# Patient Record
Sex: MALE | Race: WHITE | NOT HISPANIC OR LATINO | Employment: OTHER | URBAN - METROPOLITAN AREA
[De-identification: names, ages, dates, MRNs, and addresses within clinical notes are randomized per-mention and may not be internally consistent; named-entity substitution may affect disease eponyms.]

---

## 2023-06-08 ENCOUNTER — OFFICE VISIT (OUTPATIENT)
Dept: PRIMARY CARE | Facility: CLINIC | Age: 68
End: 2023-06-08
Payer: COMMERCIAL

## 2023-06-08 VITALS — DIASTOLIC BLOOD PRESSURE: 75 MMHG | SYSTOLIC BLOOD PRESSURE: 124 MMHG | WEIGHT: 220 LBS

## 2023-06-08 DIAGNOSIS — M54.10 RADICULOPATHY, UNSPECIFIED SPINAL REGION: Primary | ICD-10-CM

## 2023-06-08 DIAGNOSIS — I10 PRIMARY HYPERTENSION: ICD-10-CM

## 2023-06-08 PROCEDURE — 3078F DIAST BP <80 MM HG: CPT | Performed by: INTERNAL MEDICINE

## 2023-06-08 PROCEDURE — 3074F SYST BP LT 130 MM HG: CPT | Performed by: INTERNAL MEDICINE

## 2023-06-08 PROCEDURE — 99213 OFFICE O/P EST LOW 20 MIN: CPT | Performed by: INTERNAL MEDICINE

## 2023-06-08 NOTE — PROGRESS NOTES
Subjective   Patient ID: Pramod Johnson is a 67 y.o. male who presents for No chief complaint on file..    HPI follow-up visit no chest pain no shortness of breath no side effect with medication bowels normal no dysuria urine stream adequate has been having some cervical neck and left shoulder area discomfort when he sleeps during the day not so bad does not feel it is his shoulder is both shoulders appear to work well and has good overhead elevation no injury    Review of Systems    Objective   There were no vitals taken for this visit.    Physical Exam vital signs noted alert and oriented x3 NCAT cervical spine full range of motion shortened neck tightened muscles posteriorly bilateral shoulders good range of motion normal handgrips DTR 2+ upper extremities no JVD chest clear to auscultation CV regular rate and rhythm S1-S2 without murmur gallop or rub extremities no clubbing cyanosis or edema normal distal pulses    Assessment/Plan impression cervical radiculopathy hypertension other diagnoses  Plan when he returns he may do additional blood work for now he is content we will continue with blood pressure management medication diet exercise weight stability or weight loss he goes to the gymnasium regularly to work out we will obtain x-ray AP lateral cervical spine and advised on possibility probability of medication physical therapy or orthopedic follow-up for the neck and recheck after that Endor sooner 3 months

## 2023-09-07 DIAGNOSIS — Z00.00 HEALTH CARE MAINTENANCE: Primary | ICD-10-CM

## 2023-12-07 ENCOUNTER — OFFICE VISIT (OUTPATIENT)
Dept: PRIMARY CARE | Facility: CLINIC | Age: 68
End: 2023-12-07
Payer: COMMERCIAL

## 2023-12-07 VITALS — SYSTOLIC BLOOD PRESSURE: 131 MMHG | WEIGHT: 218 LBS | DIASTOLIC BLOOD PRESSURE: 71 MMHG

## 2023-12-07 DIAGNOSIS — I10 PRIMARY HYPERTENSION: ICD-10-CM

## 2023-12-07 DIAGNOSIS — N40.0 BENIGN PROSTATIC HYPERPLASIA, UNSPECIFIED WHETHER LOWER URINARY TRACT SYMPTOMS PRESENT: ICD-10-CM

## 2023-12-07 DIAGNOSIS — Z00.00 HEALTH CARE MAINTENANCE: Primary | ICD-10-CM

## 2023-12-07 LAB
ALBUMIN SERPL BCP-MCNC: 4.4 G/DL (ref 3.4–5)
ALP SERPL-CCNC: 83 U/L (ref 33–136)
ALT SERPL W P-5'-P-CCNC: 31 U/L (ref 10–52)
ANION GAP SERPL CALC-SCNC: 13 MMOL/L (ref 10–20)
APPEARANCE UR: CLEAR
AST SERPL W P-5'-P-CCNC: 31 U/L (ref 9–39)
BILIRUB SERPL-MCNC: 0.4 MG/DL (ref 0–1.2)
BILIRUB UR STRIP.AUTO-MCNC: NEGATIVE MG/DL
BUN SERPL-MCNC: 18 MG/DL (ref 6–23)
CALCIUM SERPL-MCNC: 10 MG/DL (ref 8.6–10.6)
CHLORIDE SERPL-SCNC: 106 MMOL/L (ref 98–107)
CHOLEST SERPL-MCNC: 168 MG/DL (ref 0–199)
CHOLESTEROL/HDL RATIO: 3.4
CO2 SERPL-SCNC: 27 MMOL/L (ref 21–32)
COLOR UR: YELLOW
CREAT SERPL-MCNC: 1.22 MG/DL (ref 0.5–1.3)
ERYTHROCYTE [DISTWIDTH] IN BLOOD BY AUTOMATED COUNT: 13.3 % (ref 11.5–14.5)
GFR SERPL CREATININE-BSD FRML MDRD: 66 ML/MIN/1.73M*2
GLUCOSE SERPL-MCNC: 73 MG/DL (ref 74–99)
GLUCOSE UR STRIP.AUTO-MCNC: NEGATIVE MG/DL
HCT VFR BLD AUTO: 47.4 % (ref 41–52)
HDLC SERPL-MCNC: 48.8 MG/DL
HGB BLD-MCNC: 15.6 G/DL (ref 13.5–17.5)
KETONES UR STRIP.AUTO-MCNC: NEGATIVE MG/DL
LDLC SERPL CALC-MCNC: 99 MG/DL
LEUKOCYTE ESTERASE UR QL STRIP.AUTO: NEGATIVE
MCH RBC QN AUTO: 30.1 PG (ref 26–34)
MCHC RBC AUTO-ENTMCNC: 32.9 G/DL (ref 32–36)
MCV RBC AUTO: 91 FL (ref 80–100)
NITRITE UR QL STRIP.AUTO: NEGATIVE
NON HDL CHOLESTEROL: 119 MG/DL (ref 0–149)
NRBC BLD-RTO: 0 /100 WBCS (ref 0–0)
PH UR STRIP.AUTO: 5 [PH]
PLATELET # BLD AUTO: 279 X10*3/UL (ref 150–450)
POTASSIUM SERPL-SCNC: 4.6 MMOL/L (ref 3.5–5.3)
PROT SERPL-MCNC: 7 G/DL (ref 6.4–8.2)
PROT UR STRIP.AUTO-MCNC: NEGATIVE MG/DL
PSA SERPL-MCNC: 3.75 NG/ML
RBC # BLD AUTO: 5.19 X10*6/UL (ref 4.5–5.9)
RBC # UR STRIP.AUTO: NEGATIVE /UL
SODIUM SERPL-SCNC: 141 MMOL/L (ref 136–145)
SP GR UR STRIP.AUTO: 1.02
TRIGL SERPL-MCNC: 99 MG/DL (ref 0–149)
UROBILINOGEN UR STRIP.AUTO-MCNC: <2 MG/DL
VLDL: 20 MG/DL (ref 0–40)
WBC # BLD AUTO: 7.2 X10*3/UL (ref 4.4–11.3)

## 2023-12-07 PROCEDURE — 99397 PER PM REEVAL EST PAT 65+ YR: CPT | Performed by: INTERNAL MEDICINE

## 2023-12-07 PROCEDURE — 80053 COMPREHEN METABOLIC PANEL: CPT

## 2023-12-07 PROCEDURE — 81003 URINALYSIS AUTO W/O SCOPE: CPT

## 2023-12-07 PROCEDURE — 84153 ASSAY OF PSA TOTAL: CPT

## 2023-12-07 PROCEDURE — 3078F DIAST BP <80 MM HG: CPT | Performed by: INTERNAL MEDICINE

## 2023-12-07 PROCEDURE — 80061 LIPID PANEL: CPT

## 2023-12-07 PROCEDURE — 85027 COMPLETE CBC AUTOMATED: CPT

## 2023-12-07 PROCEDURE — 93000 ELECTROCARDIOGRAM COMPLETE: CPT | Performed by: INTERNAL MEDICINE

## 2023-12-07 PROCEDURE — 36415 COLL VENOUS BLD VENIPUNCTURE: CPT

## 2023-12-07 PROCEDURE — 3075F SYST BP GE 130 - 139MM HG: CPT | Performed by: INTERNAL MEDICINE

## 2023-12-07 NOTE — PROGRESS NOTES
Subjective   Patient ID: Pramod Johnson is a 67 y.o. male who presents for No chief complaint on file..    HPI CPE see updated front sheet no chest pain no shortness of breath no side effect with medication his low back has been in good standing urine flow sometimes hesitant and some nocturia rarely no dysuria bones muscles joints otherwise okay    Past medical history hypertension    Medication amlodipine    Allergies noted and unchanged    Social history no tobacco    Family history noted and unchanged    Prevention discussed with colonoscopy discussed with vaccinations he is at the [employer] vaccination    Depression screen not depressed    Review of Systems    Objective   There were no vitals taken for this visit.    Physical Exam vital signs noted alert and oriented x 3 NCAT PERRLA EOMI nares without discharge OP benign TM normal bilateral EAC clear bilateral no AC nodes no JVD or bruit no thyromegaly chest clear to auscultation and percussion CV regular rate and rhythm S1-S2 without murmur gallop or rub abdomen soft nontender normal active bowel sounds no rebound or guarding no HSM LS spine normal curvature negative straight leg raise negative logroll negative SI joint tenderness extremities no clubbing cyanosis or edema normal distal pulses DTR 2+    Assessment/Plan    impression General medical examination hypertension other diagnoses BPH  Plan check EKG advised on heart check Chem-7 advised on glucose potassium and kidney function check CBC advised on blood count check lipid panel advised on cholesterol profile check hepatic panel advised on liver enzymes check PSA advised on prostate check urinalysis by request advised on possibility probability of infection irritation and inflammation good overall diet regular exercise increase water consumption recheck 3 months based on labs TT 60 cc 31

## 2024-01-05 DIAGNOSIS — I10 BENIGN ESSENTIAL HYPERTENSION: Primary | ICD-10-CM

## 2024-01-05 RX ORDER — AMLODIPINE BESYLATE 5 MG/1
5 TABLET ORAL DAILY
Qty: 90 TABLET | Refills: 0 | Status: SHIPPED | OUTPATIENT
Start: 2024-01-05 | End: 2024-01-16 | Stop reason: SDUPTHER

## 2024-01-05 RX ORDER — AMLODIPINE BESYLATE 5 MG/1
5 TABLET ORAL DAILY
COMMUNITY
Start: 2016-04-21 | End: 2024-01-05 | Stop reason: SDUPTHER

## 2024-01-10 ENCOUNTER — TELEPHONE (OUTPATIENT)
Dept: PRIMARY CARE | Facility: CLINIC | Age: 69
End: 2024-01-10

## 2024-01-10 DIAGNOSIS — I10 BENIGN ESSENTIAL HYPERTENSION: ICD-10-CM

## 2024-01-10 RX ORDER — AMLODIPINE BESYLATE 5 MG/1
5 TABLET ORAL DAILY
Qty: 90 TABLET | Refills: 0 | OUTPATIENT
Start: 2024-01-10

## 2024-01-16 ENCOUNTER — TELEPHONE (OUTPATIENT)
Dept: PRIMARY CARE | Facility: CLINIC | Age: 69
End: 2024-01-16

## 2024-01-16 DIAGNOSIS — I10 BENIGN ESSENTIAL HYPERTENSION: ICD-10-CM

## 2024-01-16 RX ORDER — AMLODIPINE BESYLATE 5 MG/1
5 TABLET ORAL DAILY
Qty: 90 TABLET | Refills: 0 | Status: SHIPPED | OUTPATIENT
Start: 2024-01-16 | End: 2024-04-02 | Stop reason: SDUPTHER

## 2024-03-15 ENCOUNTER — HOSPITAL ENCOUNTER (OUTPATIENT)
Dept: RADIOLOGY | Facility: CLINIC | Age: 69
Discharge: HOME | End: 2024-03-15
Payer: COMMERCIAL

## 2024-03-15 ENCOUNTER — OFFICE VISIT (OUTPATIENT)
Dept: ORTHOPEDIC SURGERY | Facility: CLINIC | Age: 69
End: 2024-03-15
Payer: COMMERCIAL

## 2024-03-15 DIAGNOSIS — M79.641 RIGHT HAND PAIN: Primary | ICD-10-CM

## 2024-03-15 DIAGNOSIS — M79.641 RIGHT HAND PAIN: ICD-10-CM

## 2024-03-15 PROCEDURE — 1125F AMNT PAIN NOTED PAIN PRSNT: CPT | Performed by: STUDENT IN AN ORGANIZED HEALTH CARE EDUCATION/TRAINING PROGRAM

## 2024-03-15 PROCEDURE — 73130 X-RAY EXAM OF HAND: CPT | Mod: RIGHT SIDE | Performed by: RADIOLOGY

## 2024-03-15 PROCEDURE — 73130 X-RAY EXAM OF HAND: CPT | Mod: RT

## 2024-03-15 PROCEDURE — 1159F MED LIST DOCD IN RCRD: CPT | Performed by: STUDENT IN AN ORGANIZED HEALTH CARE EDUCATION/TRAINING PROGRAM

## 2024-03-15 PROCEDURE — 99204 OFFICE O/P NEW MOD 45 MIN: CPT | Performed by: STUDENT IN AN ORGANIZED HEALTH CARE EDUCATION/TRAINING PROGRAM

## 2024-03-15 ASSESSMENT — PAIN - FUNCTIONAL ASSESSMENT: PAIN_FUNCTIONAL_ASSESSMENT: 0-10

## 2024-03-15 ASSESSMENT — PAIN DESCRIPTION - DESCRIPTORS: DESCRIPTORS: ACHING

## 2024-03-15 ASSESSMENT — PAIN SCALES - GENERAL: PAINLEVEL_OUTOF10: 3

## 2024-03-15 NOTE — PROGRESS NOTES
[facility]   Hand and Upper Extremity Service  Initial evaluation / Consultation        Consult requested by Referring Physician: Surya Mccain DO     Chief Complaint: Right ring finger pain     Patient is a pleasant 68-year-old male who presents with right hand pain.  His pain is primarily located to the PIP joint of his right ring finger.  This is a longstanding however it is recently asked exacerbated over the last few months.  He denies any clicking or locking.  The pain is located to the dorsal PIP joint.  He notes he has had previous corticosteroid injections in that region several years ago by Dr. Marie.  He notes good relief with his previous injections.  He denies any numbness or tingling.        Please refer to New Patient Intake Form scanned into patient's electronic record for self reported past medical history, past surgical history, medications, allergies, family history, social history and 10 point review of systems     Examination:  Constitutional: Oriented to person, place, and time.  Appears well-developed and well-nourished.  Head: Normocephalic and atraumatic.  Eyes: Pupils are equal, round, and reactive to light.  Cardiovascular: Intact distal pulses.   Pulmonary/Chest/Breast: Effort normal. No respiratory distress.  Neurological: Alert and oriented to person, place, and time.  Skin: Skin is warm and dry.  Psychiatric: normal mood and affect.  Behavior is normal.  Musculoskeletal: Right Hand Ring Finger  No swelling or ecchymosis present.  Mild tenderness to palpation over the PIP joint dorsally.  Nontender over the collateral ligaments or volar plate.  No instability to stress testing.  Patient can fully extend the MP and IP joints.  Can make a composite fist to the distal palmar crease.  He is nontender over the A1 pulley.  Extensor tendons are centered over the MCP joint.  There is no clicking or locking noted.  Sensations intact to light touch to radial  ulnar sides of the finger.  AIN, PIN, ulnar motor nerves intact.  Sensation intact to light touch to radial, ulnar, median nerves.  Brisk capillary refill         Personal Interpretation of Diagnostic studies: XR of the right hand was taken reviewed in office today  3 views demonstrate no fracture dislocations.  No significant arthritic or erosive changes.  Impression normal right hand x-rays         Impression: Right ring finger PIP joint pain.         In Office Procedures Performed: None         Medical Decision Making:   Patient pain is very mild and likely related to mild arthritic changes at the PIP joint.  We discussed conservative management form of anti-inflammatories and diclofenac gel.  He was also given stretches to do to maintain motion.  The patient will continue to use his hand and work on stretches.  We discussed that this pain persist and worsens then We would consider a corticosteroid injection to the PIP joint.  He states the pain is not at that point at this time.  Overall he is happy with this plan.         Medications Prescribed: None        Follow up: As needed           Will BeucleDO obdulio  [facility]  Department of Orthopaedic Surgery  Hand and Upper Extremity Reconstruction           Dictation performed with the use of voice recognition software.  Syntax and grammatical errors may exist.

## 2024-04-02 DIAGNOSIS — I10 BENIGN ESSENTIAL HYPERTENSION: ICD-10-CM

## 2024-04-02 RX ORDER — AMLODIPINE BESYLATE 5 MG/1
5 TABLET ORAL DAILY
Qty: 90 TABLET | Refills: 0 | Status: SHIPPED | OUTPATIENT
Start: 2024-04-02

## 2024-06-06 ENCOUNTER — OFFICE VISIT (OUTPATIENT)
Dept: PRIMARY CARE | Facility: CLINIC | Age: 69
End: 2024-06-06
Payer: COMMERCIAL

## 2024-06-06 VITALS — SYSTOLIC BLOOD PRESSURE: 118 MMHG | WEIGHT: 220 LBS | DIASTOLIC BLOOD PRESSURE: 75 MMHG

## 2024-06-06 DIAGNOSIS — G44.209 TENSION HEADACHE: ICD-10-CM

## 2024-06-06 DIAGNOSIS — R42 DIZZINESS: ICD-10-CM

## 2024-06-06 DIAGNOSIS — I10 PRIMARY HYPERTENSION: Primary | ICD-10-CM

## 2024-06-06 PROCEDURE — 3074F SYST BP LT 130 MM HG: CPT | Performed by: INTERNAL MEDICINE

## 2024-06-06 PROCEDURE — 3078F DIAST BP <80 MM HG: CPT | Performed by: INTERNAL MEDICINE

## 2024-06-06 PROCEDURE — 99213 OFFICE O/P EST LOW 20 MIN: CPT | Performed by: INTERNAL MEDICINE

## 2024-06-06 NOTE — PROGRESS NOTES
Subjective   Patient ID: Pramod Johnson is a 68 y.o. male who presents for No chief complaint on file..    HPI follow-up visit no chest pain no shortness of breath no side effect with medication running sometimes up to 10 hours and training for a 50 mile race occasionally will have brief dizziness lasting seconds does not have much in the way of sinus sometimes has some headaches but always better after eating and getting the hydrated no bowel or bladder issues prior blood work reviewed    Review of Systems    Objective   There were no vitals taken for this visit.    Physical Exam  Vital signs noted alert and oriented x 3 NCAT no coryza nares without discharge OP benign TM normal bilateral EAC clear bilateral no AC nodes no JVD chest clear to auscultation CV regular rate and rhythm S1-S2 without murmur gallop rub or skip extremities no clubbing cyanosis or edema normal distal pulses   Assessment/Plan    impression hypertension other diagnoses headache dizziness  Plan stay well-hydrated especially while running he actually carries his food and drink while running in a backpack sort of way not typical of marathon running continue with blood pressure medicine he takes it at night so does not interfere with racing Tylenol as needed Zyrtec as needed then recheck regular visit physical examination blood work discussed with his recent PSA values not having any symptoms follow-up sooner as needed

## 2024-06-24 DIAGNOSIS — I10 BENIGN ESSENTIAL HYPERTENSION: Primary | ICD-10-CM

## 2024-06-24 RX ORDER — AMLODIPINE BESYLATE 5 MG/1
5 TABLET ORAL DAILY
Qty: 90 TABLET | Refills: 0 | Status: SHIPPED | OUTPATIENT
Start: 2024-06-24

## 2024-08-23 ENCOUNTER — APPOINTMENT (OUTPATIENT)
Dept: ORTHOPEDIC SURGERY | Facility: CLINIC | Age: 69
End: 2024-08-23
Payer: COMMERCIAL

## 2024-08-23 DIAGNOSIS — M65.342 ACQUIRED TRIGGER FINGER OF BOTH RING FINGERS: ICD-10-CM

## 2024-08-23 DIAGNOSIS — M65.341 ACQUIRED TRIGGER FINGER OF BOTH RING FINGERS: ICD-10-CM

## 2024-08-23 DIAGNOSIS — M65.332 ACQUIRED TRIGGER FINGER OF BOTH MIDDLE FINGERS: Primary | ICD-10-CM

## 2024-08-23 DIAGNOSIS — M65.331 ACQUIRED TRIGGER FINGER OF BOTH MIDDLE FINGERS: Primary | ICD-10-CM

## 2024-08-23 RX ORDER — TRIAMCINOLONE ACETONIDE 40 MG/ML
20 INJECTION, SUSPENSION INTRA-ARTICULAR; INTRAMUSCULAR
Status: COMPLETED | OUTPATIENT
Start: 2024-08-23 | End: 2024-08-23

## 2024-08-23 ASSESSMENT — PAIN - FUNCTIONAL ASSESSMENT: PAIN_FUNCTIONAL_ASSESSMENT: 0-10

## 2024-08-23 ASSESSMENT — PAIN DESCRIPTION - DESCRIPTORS: DESCRIPTORS: ACHING

## 2024-08-23 ASSESSMENT — PAIN SCALES - GENERAL: PAINLEVEL_OUTOF10: 7

## 2024-08-23 NOTE — PROGRESS NOTES
History of Present Illness:  Patient presents today for evaluation of side: bilaterallong and ring  finger pain. Endorses catching of the digit with flexion. Denies any traumatic event or injury. The patient notes that it is worse with periods of disuse and in the morning and better when warmed up.  The patient endorses localized, focal pain when it catches / sharp in nature.  The patient denies any numbness and tingling.      He has had previous trigger finger injections by Dr. Marie in the past with good relief.    Past Medical History:   Diagnosis Date    Benign prostatic hyperplasia without lower urinary tract symptoms 12/07/2020    Prostatism       Medication Documentation Review Audit       Reviewed by Fransisca Rene MA (Medical Assistant) on 08/23/24 at 0818      Medication Order Taking? Sig Documenting Provider Last Dose Status   amLODIPine (Norvasc) 5 mg tablet [id number]  Take 1 tablet (5 mg) by mouth once daily. Nj Kwon MD  Active                    No Known Allergies    Social History     Socioeconomic History    Marital status:      Spouse name: Not on file    Number of children: Not on file    Years of education: Not on file    Highest education level: Not on file   Occupational History    Not on file   Tobacco Use    Smoking status: Not on file    Smokeless tobacco: Not on file   Substance and Sexual Activity    Alcohol use: Not on file    Drug use: Not on file    Sexual activity: Not on file   Other Topics Concern    Not on file   Social History Narrative    Not on file     Social Determinants of Health     Financial Resource Strain: Not on file   Food Insecurity: Not on file   Transportation Needs: Not on file   Physical Activity: Not on file   Stress: Not on file   Social Connections: Not on file   Intimate Partner Violence: Not on file   Housing Stability: Not on file       No past surgical history on file.       Review of Systems   GENERAL: Negative for malaise,  significant weight loss, fever  MUSCULOSKELETAL: see HPI  NEURO:  Negative     Physical Examination  side: bilateral hand  ring and long  finger:  No obvious atrophy, no skin changes  Tenderness, palpable nodule along the flexor tendon sheath with passive ROM  Positive triggering with active flexion and extension   No subluxation of the extensor tendon appreciated over the MP joint.  Normal neurovascular exam distally     Imaging: None     Assessment:  Patient with side: bilateralhand  ring and long  digit trigger finger     Plan:  We reviewed the disease process with the patient. I explained the etiology of trigger finger. As the flexor tendons enter the flexor tendon sheath at the level of the A1 pulley, there is a size mismatch. Due to many years of use, the tendon has become inflamed or the sheath has become inflamed and it no longer easily glides into the tunnel.   We discussed the role for injections. The majority of patients (60-80%) will achieve symptom relief with corticosteroid injection, although sometimes it takes more than one. Patients with diabetes or symptoms for longer than 6 months have a lower chance of successful resolution of symptoms with injections. Patients who are not satisfied with symptom relief from injections may be candidates for trigger finger release.  We discussed surgical intervention reviewing the risks (bleeding, neurologic injury, wound issues including infection, and recurrence) and benefits.  The patient elected for: Corticosteroid injections to bilateral ring and long trigger fingers.  Patient tolerated procedure well.         Hand / UE Inj/Asp: bilateral long A1 for trigger finger on 8/23/2024 12:43 PM  Indications: therapeutic, pain and tendon swelling  Details: 25 G needle, volar approach  Medications (Right): 20 mg triamcinolone acetonide 40 mg/mL  Medications (Left): 20 mg triamcinolone acetonide 40 mg/mL  Outcome: tolerated well, no immediate complications  Procedure,  treatment alternatives, risks and benefits explained, specific risks discussed. Consent was given by the patient. Immediately prior to procedure a time out was called to verify the correct patient, procedure, equipment, support staff and site/side marked as required. Patient was prepped and draped in the usual sterile fashion.       Hand / UE Inj/Asp: bilateral ring A1 for trigger finger on 8/23/2024 12:43 PM  Indications: therapeutic, pain and tendon swelling  Details: 25 G needle, volar approach  Medications (Right): 20 mg triamcinolone acetonide 40 mg/mL  Medications (Left): 20 mg triamcinolone acetonide 40 mg/mL  Outcome: tolerated well, no immediate complications  Procedure, treatment alternatives, risks and benefits explained, specific risks discussed. Consent was given by the patient. Immediately prior to procedure a time out was called to verify the correct patient, procedure, equipment, support staff and site/side marked as required. Patient was prepped and draped in the usual sterile fashion.

## 2024-09-19 DIAGNOSIS — I10 BENIGN ESSENTIAL HYPERTENSION: ICD-10-CM

## 2024-09-19 RX ORDER — AMLODIPINE BESYLATE 5 MG/1
5 TABLET ORAL DAILY
Qty: 90 TABLET | Refills: 0 | Status: SHIPPED | OUTPATIENT
Start: 2024-09-19

## 2024-12-05 ENCOUNTER — APPOINTMENT (OUTPATIENT)
Dept: PRIMARY CARE | Facility: CLINIC | Age: 69
End: 2024-12-05
Payer: COMMERCIAL

## 2024-12-05 ENCOUNTER — LAB (OUTPATIENT)
Dept: LAB | Facility: LAB | Age: 69
End: 2024-12-05
Payer: COMMERCIAL

## 2024-12-05 VITALS — DIASTOLIC BLOOD PRESSURE: 76 MMHG | WEIGHT: 220 LBS | SYSTOLIC BLOOD PRESSURE: 135 MMHG

## 2024-12-05 DIAGNOSIS — N40.0 BENIGN PROSTATIC HYPERPLASIA, UNSPECIFIED WHETHER LOWER URINARY TRACT SYMPTOMS PRESENT: ICD-10-CM

## 2024-12-05 DIAGNOSIS — Z00.00 HEALTH CARE MAINTENANCE: Primary | ICD-10-CM

## 2024-12-05 DIAGNOSIS — I10 PRIMARY HYPERTENSION: ICD-10-CM

## 2024-12-05 DIAGNOSIS — Z00.00 HEALTH CARE MAINTENANCE: ICD-10-CM

## 2024-12-05 LAB
ALBUMIN SERPL BCP-MCNC: 4.3 G/DL (ref 3.4–5)
ALP SERPL-CCNC: 84 U/L (ref 33–136)
ALT SERPL W P-5'-P-CCNC: 31 U/L (ref 10–52)
ANION GAP SERPL CALC-SCNC: 12 MMOL/L (ref 10–20)
APPEARANCE UR: ABNORMAL
AST SERPL W P-5'-P-CCNC: 28 U/L (ref 9–39)
BILIRUB SERPL-MCNC: 0.7 MG/DL (ref 0–1.2)
BILIRUB UR STRIP.AUTO-MCNC: NEGATIVE MG/DL
BUN SERPL-MCNC: 22 MG/DL (ref 6–23)
CALCIUM SERPL-MCNC: 10.3 MG/DL (ref 8.6–10.6)
CHLORIDE SERPL-SCNC: 105 MMOL/L (ref 98–107)
CHOLEST SERPL-MCNC: 185 MG/DL (ref 0–199)
CHOLESTEROL/HDL RATIO: 3.6
CO2 SERPL-SCNC: 27 MMOL/L (ref 21–32)
COLOR UR: YELLOW
CREAT SERPL-MCNC: 1.22 MG/DL (ref 0.5–1.3)
EGFRCR SERPLBLD CKD-EPI 2021: 65 ML/MIN/1.73M*2
ERYTHROCYTE [DISTWIDTH] IN BLOOD BY AUTOMATED COUNT: 12.9 % (ref 11.5–14.5)
GLUCOSE SERPL-MCNC: 78 MG/DL (ref 74–99)
GLUCOSE UR STRIP.AUTO-MCNC: NORMAL MG/DL
HCT VFR BLD AUTO: 47.1 % (ref 41–52)
HDLC SERPL-MCNC: 51.5 MG/DL
HGB BLD-MCNC: 15.5 G/DL (ref 13.5–17.5)
KETONES UR STRIP.AUTO-MCNC: NEGATIVE MG/DL
LDLC SERPL CALC-MCNC: 112 MG/DL
LEUKOCYTE ESTERASE UR QL STRIP.AUTO: NEGATIVE
MCH RBC QN AUTO: 29.9 PG (ref 26–34)
MCHC RBC AUTO-ENTMCNC: 32.9 G/DL (ref 32–36)
MCV RBC AUTO: 91 FL (ref 80–100)
NITRITE UR QL STRIP.AUTO: NEGATIVE
NON HDL CHOLESTEROL: 134 MG/DL (ref 0–149)
NRBC BLD-RTO: 0 /100 WBCS (ref 0–0)
PH UR STRIP.AUTO: 5.5 [PH]
PLATELET # BLD AUTO: 276 X10*3/UL (ref 150–450)
POTASSIUM SERPL-SCNC: 4.6 MMOL/L (ref 3.5–5.3)
PROT SERPL-MCNC: 7 G/DL (ref 6.4–8.2)
PROT UR STRIP.AUTO-MCNC: NEGATIVE MG/DL
PSA SERPL-MCNC: 3.89 NG/ML
RBC # BLD AUTO: 5.18 X10*6/UL (ref 4.5–5.9)
RBC # UR STRIP.AUTO: NEGATIVE /UL
SODIUM SERPL-SCNC: 139 MMOL/L (ref 136–145)
SP GR UR STRIP.AUTO: 1.02
TRIGL SERPL-MCNC: 108 MG/DL (ref 0–149)
UROBILINOGEN UR STRIP.AUTO-MCNC: NORMAL MG/DL
VLDL: 22 MG/DL (ref 0–40)
WBC # BLD AUTO: 7.6 X10*3/UL (ref 4.4–11.3)

## 2024-12-05 PROCEDURE — 80053 COMPREHEN METABOLIC PANEL: CPT

## 2024-12-05 PROCEDURE — 81003 URINALYSIS AUTO W/O SCOPE: CPT

## 2024-12-05 PROCEDURE — 85027 COMPLETE CBC AUTOMATED: CPT

## 2024-12-05 PROCEDURE — 93000 ELECTROCARDIOGRAM COMPLETE: CPT | Performed by: INTERNAL MEDICINE

## 2024-12-05 PROCEDURE — 84153 ASSAY OF PSA TOTAL: CPT

## 2024-12-05 PROCEDURE — 99397 PER PM REEVAL EST PAT 65+ YR: CPT | Performed by: INTERNAL MEDICINE

## 2024-12-05 PROCEDURE — 3078F DIAST BP <80 MM HG: CPT | Performed by: INTERNAL MEDICINE

## 2024-12-05 PROCEDURE — 36415 COLL VENOUS BLD VENIPUNCTURE: CPT

## 2024-12-05 PROCEDURE — 80061 LIPID PANEL: CPT

## 2024-12-05 PROCEDURE — 3075F SYST BP GE 130 - 139MM HG: CPT | Performed by: INTERNAL MEDICINE

## 2024-12-05 NOTE — PROGRESS NOTES
Subjective   Patient ID: Pramod Johnson is a 68 y.o. male who presents for No chief complaint on file..    HPI CPE see updated front sheet no chest pain no shortness of breath no side effect with medication nocturia x 3 urine stream adequate he is okay with those no dysuria bowels joints okay bowels normal    Past medical history hypertension bph    Medication amlodipine    Allergies noted and unchanged    Social history no tobacco    Family history noted and unchanged    Prevention discussed with colonoscopy will follow-up when due discussed with vaccinations  some prior blood work reviewed and    depression screen not depressed    Review of Systems    Objective   There were no vitals taken for this visit.    Physical Exam vital signs noted alert and oriented x 3 NCAT PERRLA EOMI nares without discharge OP benign TM normal bilateral EAC clear bilateral no AC nodes no JVD or bruit no thyromegaly chest clear to auscultation and percussion  No wheezing no crackles CV regular rate and rhythm S1-S2 without murmur gallop or rub abdomen soft nontender normal active bowel sounds no rebound or guarding no HSM LS spine normal curvature negative straight leg raise negative logroll negative SI joint tenderness extremities no clubbing cyanosis or edema normal distal pulses DTR 2+    Assessment/Plan    impression General medical examination hypertension other diagnoses BPH  Plan check EKG advised on heart and blood pressure and blood pressure medicine check comprehensive panel advised on glucose potassium and kidney function as well as liver function check urinalysis by request advised on the urine check PSA advised on prostate would also like to have a urology referral does not know if he will do that does not want medication he currently but will evaluate check CBC advised on blood count follow-up for next colonoscopy check lipid panel advised on cholesterol profile diet weight loss exercise good water consumption and fluid  management and recheck 3 months based on above TT cc 31     addendum also with send an ocular issue it 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

## 2024-12-28 DIAGNOSIS — I10 BENIGN ESSENTIAL HYPERTENSION: ICD-10-CM

## 2025-01-01 RX ORDER — AMLODIPINE BESYLATE 5 MG/1
5 TABLET ORAL DAILY
Qty: 90 TABLET | Refills: 0 | Status: SHIPPED | OUTPATIENT
Start: 2025-01-01

## 2025-03-28 ENCOUNTER — APPOINTMENT (OUTPATIENT)
Dept: RADIOLOGY | Facility: HOSPITAL | Age: 70
End: 2025-03-28
Payer: MEDICARE

## 2025-03-28 ENCOUNTER — APPOINTMENT (OUTPATIENT)
Dept: CARDIOLOGY | Facility: HOSPITAL | Age: 70
End: 2025-03-28
Payer: MEDICARE

## 2025-03-28 ENCOUNTER — HOSPITAL ENCOUNTER (OUTPATIENT)
Facility: HOSPITAL | Age: 70
Setting detail: OBSERVATION
Discharge: HOME | End: 2025-03-29
Attending: EMERGENCY MEDICINE | Admitting: HOSPITALIST
Payer: MEDICARE

## 2025-03-28 DIAGNOSIS — R79.89 ELEVATED SERUM CREATININE: ICD-10-CM

## 2025-03-28 DIAGNOSIS — S62.337A CLOSED DISPLACED FRACTURE OF NECK OF FIFTH METACARPAL BONE OF LEFT HAND, INITIAL ENCOUNTER: ICD-10-CM

## 2025-03-28 DIAGNOSIS — I10 BENIGN ESSENTIAL HYPERTENSION: ICD-10-CM
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
## 2025-03-28 DIAGNOSIS — R55 SYNCOPE AND COLLAPSE: ICD-10-CM

## 2025-03-28 LAB
ALBUMIN SERPL BCP-MCNC: 4.1 G/DL (ref 3.4–5)
ALP SERPL-CCNC: 82 U/L (ref 33–136)
ALT SERPL W P-5'-P-CCNC: 30 U/L (ref 10–52)
AMPHETAMINES UR QL SCN: NORMAL
ANION GAP BLDV CALCULATED.4IONS-SCNC: 8 MMOL/L (ref 10–25)
ANION GAP SERPL CALC-SCNC: 14 MMOL/L (ref 10–20)
APAP SERPL-MCNC: <10 UG/ML
APPEARANCE UR: CLEAR
AST SERPL W P-5'-P-CCNC: 29 U/L (ref 9–39)
BARBITURATES UR QL SCN: NORMAL
BASE EXCESS BLDV CALC-SCNC: 2.4 MMOL/L (ref -2–3)
BASOPHILS # BLD AUTO: 0.02 X10*3/UL (ref 0–0.1)
BASOPHILS NFR BLD AUTO: 0.1 %
BENZODIAZ UR QL SCN: NORMAL
BILIRUB DIRECT SERPL-MCNC: 0.1 MG/DL (ref 0–0.3)
BILIRUB SERPL-MCNC: 0.7 MG/DL (ref 0–1.2)
BILIRUB UR STRIP.AUTO-MCNC: NEGATIVE MG/DL
BODY TEMPERATURE: 37 DEGREES CELSIUS
BUN SERPL-MCNC: 21 MG/DL (ref 6–23)
BZE UR QL SCN: NORMAL
CA-I BLDV-SCNC: 1.25 MMOL/L (ref 1.1–1.33)
CALCIUM SERPL-MCNC: 9.7 MG/DL (ref 8.6–10.3)
CANNABINOIDS UR QL SCN: NORMAL
CARDIAC TROPONIN I PNL SERPL HS: 7 NG/L (ref 0–20)
CARDIAC TROPONIN I PNL SERPL HS: 8 NG/L (ref 0–20)
CHLORIDE BLDV-SCNC: 104 MMOL/L (ref 98–107)
CHLORIDE SERPL-SCNC: 106 MMOL/L (ref 98–107)
CO2 SERPL-SCNC: 25 MMOL/L (ref 21–32)
COLOR UR: ABNORMAL
CREAT SERPL-MCNC: 1.41 MG/DL (ref 0.5–1.3)
EGFRCR SERPLBLD CKD-EPI 2021: 55 ML/MIN/1.73M*2
EOSINOPHIL # BLD AUTO: 0.13 X10*3/UL (ref 0–0.7)
EOSINOPHIL NFR BLD AUTO: 0.8 %
ERYTHROCYTE [DISTWIDTH] IN BLOOD BY AUTOMATED COUNT: 13.4 % (ref 11.5–14.5)
ETHANOL SERPL-MCNC: <10 MG/DL
FENTANYL+NORFENTANYL UR QL SCN: NORMAL
GLUCOSE BLD MANUAL STRIP-MCNC: 113 MG/DL (ref 74–99)
GLUCOSE BLDV-MCNC: 124 MG/DL (ref 74–99)
GLUCOSE SERPL-MCNC: 120 MG/DL (ref 74–99)
GLUCOSE UR STRIP.AUTO-MCNC: NORMAL MG/DL
HCO3 BLDV-SCNC: 26.4 MMOL/L (ref 22–26)
HCT VFR BLD AUTO: 45.8 % (ref 41–52)
HCT VFR BLD EST: 50 % (ref 41–52)
HGB BLD-MCNC: 15.7 G/DL (ref 13.5–17.5)
HGB BLDV-MCNC: 16.6 G/DL (ref 13.5–17.5)
IMM GRANULOCYTES # BLD AUTO: 0.09 X10*3/UL (ref 0–0.7)
IMM GRANULOCYTES NFR BLD AUTO: 0.6 % (ref 0–0.9)
INHALED O2 CONCENTRATION: 21 %
INR PPP: 1 (ref 0.9–1.1)
KETONES UR STRIP.AUTO-MCNC: NEGATIVE MG/DL
LACTATE BLDV-SCNC: 2 MMOL/L (ref 0.4–2)
LACTATE SERPL-SCNC: 1.8 MMOL/L (ref 0.4–2)
LEUKOCYTE ESTERASE UR QL STRIP.AUTO: NEGATIVE
LIPASE SERPL-CCNC: 49 U/L (ref 9–82)
LYMPHOCYTES # BLD AUTO: 0.75 X10*3/UL (ref 1.2–4.8)
LYMPHOCYTES NFR BLD AUTO: 4.8 %
MAGNESIUM SERPL-MCNC: 2.05 MG/DL (ref 1.6–2.4)
MCH RBC QN AUTO: 29.5 PG (ref 26–34)
MCHC RBC AUTO-ENTMCNC: 34.3 G/DL (ref 32–36)
MCV RBC AUTO: 86 FL (ref 80–100)
METHADONE UR QL SCN: NORMAL
MONOCYTES # BLD AUTO: 0.89 X10*3/UL (ref 0.1–1)
MONOCYTES NFR BLD AUTO: 5.7 %
NEUTROPHILS # BLD AUTO: 13.86 X10*3/UL (ref 1.2–7.7)
NEUTROPHILS NFR BLD AUTO: 88 %
NITRITE UR QL STRIP.AUTO: NEGATIVE
NRBC BLD-RTO: 0 /100 WBCS (ref 0–0)
OPIATES UR QL SCN: NORMAL
OXYCODONE+OXYMORPHONE UR QL SCN: NORMAL
OXYHGB MFR BLDV: 34.9 % (ref 45–75)
PCO2 BLDV: 38 MM HG (ref 41–51)
PCP UR QL SCN: NORMAL
PH BLDV: 7.45 PH (ref 7.33–7.43)
PH UR STRIP.AUTO: 8 [PH]
PLATELET # BLD AUTO: 231 X10*3/UL (ref 150–450)
PO2 BLDV: 21 MM HG (ref 35–45)
POTASSIUM BLDV-SCNC: 4.3 MMOL/L (ref 3.5–5.3)
POTASSIUM SERPL-SCNC: 4.8 MMOL/L (ref 3.5–5.3)
PROT SERPL-MCNC: 6.7 G/DL (ref 6.4–8.2)
PROT UR STRIP.AUTO-MCNC: NEGATIVE MG/DL
PROTHROMBIN TIME: 10.6 SECONDS (ref 9.8–12.4)
RBC # BLD AUTO: 5.32 X10*6/UL (ref 4.5–5.9)
RBC # UR STRIP.AUTO: NEGATIVE MG/DL
SALICYLATES SERPL-MCNC: <3 MG/DL
SAO2 % BLDV: 35 % (ref 45–75)
SODIUM BLDV-SCNC: 134 MMOL/L (ref 136–145)
SODIUM SERPL-SCNC: 140 MMOL/L (ref 136–145)
SP GR UR STRIP.AUTO: 1.04
UROBILINOGEN UR STRIP.AUTO-MCNC: NORMAL MG/DL
WBC # BLD AUTO: 15.7 X10*3/UL (ref 4.4–11.3)

## 2025-03-28 PROCEDURE — 70450 CT HEAD/BRAIN W/O DYE: CPT | Performed by: INTERNAL MEDICINE

## 2025-03-28 PROCEDURE — 2500000004 HC RX 250 GENERAL PHARMACY W/ HCPCS (ALT 636 FOR OP/ED): Performed by: HOSPITALIST

## 2025-03-28 PROCEDURE — 74177 CT ABD & PELVIS W/CONTRAST: CPT

## 2025-03-28 PROCEDURE — 80143 DRUG ASSAY ACETAMINOPHEN: CPT | Performed by: EMERGENCY MEDICINE

## 2025-03-28 PROCEDURE — 74177 CT ABD & PELVIS W/CONTRAST: CPT | Performed by: INTERNAL MEDICINE

## 2025-03-28 PROCEDURE — 2500000004 HC RX 250 GENERAL PHARMACY W/ HCPCS (ALT 636 FOR OP/ED): Performed by: EMERGENCY MEDICINE

## 2025-03-28 PROCEDURE — 36415 COLL VENOUS BLD VENIPUNCTURE: CPT | Performed by: EMERGENCY MEDICINE

## 2025-03-28 PROCEDURE — 82947 ASSAY GLUCOSE BLOOD QUANT: CPT

## 2025-03-28 PROCEDURE — 84132 ASSAY OF SERUM POTASSIUM: CPT | Performed by: EMERGENCY MEDICINE

## 2025-03-28 PROCEDURE — G0427 INPT/ED TELECONSULT70: HCPCS | Performed by: HOSPITALIST

## 2025-03-28 PROCEDURE — 85610 PROTHROMBIN TIME: CPT | Performed by: EMERGENCY MEDICINE

## 2025-03-28 PROCEDURE — 80307 DRUG TEST PRSMV CHEM ANLYZR: CPT | Performed by: EMERGENCY MEDICINE

## 2025-03-28 PROCEDURE — 72125 CT NECK SPINE W/O DYE: CPT

## 2025-03-28 PROCEDURE — 73130 X-RAY EXAM OF HAND: CPT | Mod: LT

## 2025-03-28 PROCEDURE — 72128 CT CHEST SPINE W/O DYE: CPT | Performed by: INTERNAL MEDICINE

## 2025-03-28 PROCEDURE — 96372 THER/PROPH/DIAG INJ SC/IM: CPT | Mod: 59 | Performed by: HOSPITALIST

## 2025-03-28 PROCEDURE — 73110 X-RAY EXAM OF WRIST: CPT | Mod: LT

## 2025-03-28 PROCEDURE — 80053 COMPREHEN METABOLIC PANEL: CPT | Performed by: EMERGENCY MEDICINE

## 2025-03-28 PROCEDURE — 84484 ASSAY OF TROPONIN QUANT: CPT | Mod: 91 | Performed by: EMERGENCY MEDICINE

## 2025-03-28 PROCEDURE — 82248 BILIRUBIN DIRECT: CPT | Performed by: EMERGENCY MEDICINE

## 2025-03-28 PROCEDURE — 93005 ELECTROCARDIOGRAM TRACING: CPT

## 2025-03-28 PROCEDURE — 73130 X-RAY EXAM OF HAND: CPT | Mod: LEFT SIDE | Performed by: RADIOLOGY

## 2025-03-28 PROCEDURE — 72131 CT LUMBAR SPINE W/O DYE: CPT | Performed by: INTERNAL MEDICINE

## 2025-03-28 PROCEDURE — 83605 ASSAY OF LACTIC ACID: CPT | Performed by: EMERGENCY MEDICINE

## 2025-03-28 PROCEDURE — 84484 ASSAY OF TROPONIN QUANT: CPT | Performed by: EMERGENCY MEDICINE

## 2025-03-28 PROCEDURE — G0378 HOSPITAL OBSERVATION PER HR: HCPCS

## 2025-03-28 PROCEDURE — 96361 HYDRATE IV INFUSION ADD-ON: CPT

## 2025-03-28 PROCEDURE — 2550000001 HC RX 255 CONTRASTS: Performed by: EMERGENCY MEDICINE

## 2025-03-28 PROCEDURE — 72128 CT CHEST SPINE W/O DYE: CPT

## 2025-03-28 PROCEDURE — 70450 CT HEAD/BRAIN W/O DYE: CPT

## 2025-03-28 PROCEDURE — 73110 X-RAY EXAM OF WRIST: CPT | Mod: LEFT SIDE | Performed by: RADIOLOGY

## 2025-03-28 PROCEDURE — 72125 CT NECK SPINE W/O DYE: CPT | Performed by: INTERNAL MEDICINE

## 2025-03-28 PROCEDURE — 81003 URINALYSIS AUTO W/O SCOPE: CPT | Performed by: EMERGENCY MEDICINE

## 2025-03-28 PROCEDURE — 96374 THER/PROPH/DIAG INJ IV PUSH: CPT

## 2025-03-28 PROCEDURE — 83735 ASSAY OF MAGNESIUM: CPT | Performed by: EMERGENCY MEDICINE

## 2025-03-28 PROCEDURE — 85025 COMPLETE CBC W/AUTO DIFF WBC: CPT | Performed by: EMERGENCY MEDICINE

## 2025-03-28 PROCEDURE — 99223 1ST HOSP IP/OBS HIGH 75: CPT | Performed by: HOSPITALIST

## 2025-03-28 PROCEDURE — 83690 ASSAY OF LIPASE: CPT | Performed by: EMERGENCY MEDICINE

## 2025-03-28 PROCEDURE — 71260 CT THORAX DX C+: CPT | Performed by: INTERNAL MEDICINE

## 2025-03-28 PROCEDURE — 72131 CT LUMBAR SPINE W/O DYE: CPT

## 2025-03-28 PROCEDURE — 99285 EMERGENCY DEPT VISIT HI MDM: CPT | Mod: 25 | Performed by: EMERGENCY MEDICINE

## 2025-03-28 RX ORDER — ALUMINUM HYDROXIDE, MAGNESIUM HYDROXIDE, AND SIMETHICONE 1200; 120; 1200 MG/30ML; MG/30ML; MG/30ML
20 SUSPENSION ORAL EVERY 4 HOURS PRN
Status: DISCONTINUED | OUTPATIENT
Start: 2025-03-28 | End: 2025-03-29 | Stop reason: HOSPADM

## 2025-03-28 RX ORDER — GENTAMICIN SULFATE 1 MG/G
1 OINTMENT TOPICAL DAILY
COMMUNITY

## 2025-03-28 RX ORDER — AMLODIPINE BESYLATE 5 MG/1
5 TABLET ORAL DAILY
Status: DISCONTINUED | OUTPATIENT
Start: 2025-03-29 | End: 2025-03-29 | Stop reason: HOSPADM

## 2025-03-28 RX ORDER — TALC
9 POWDER (GRAM) TOPICAL DAILY PRN
Status: DISCONTINUED | OUTPATIENT
Start: 2025-03-28 | End: 2025-03-29 | Stop reason: HOSPADM

## 2025-03-28 RX ORDER — ONDANSETRON HYDROCHLORIDE 2 MG/ML
4 INJECTION, SOLUTION INTRAVENOUS ONCE
Status: COMPLETED | OUTPATIENT
Start: 2025-03-28 | End: 2025-03-28

## 2025-03-28 RX ORDER — ACETAMINOPHEN 325 MG/1
650 TABLET ORAL EVERY 6 HOURS PRN
Status: DISCONTINUED | OUTPATIENT
Start: 2025-03-28 | End: 2025-03-29 | Stop reason: HOSPADM

## 2025-03-28 RX ORDER — ENOXAPARIN SODIUM 100 MG/ML
40 INJECTION SUBCUTANEOUS DAILY
Status: DISCONTINUED | OUTPATIENT
Start: 2025-03-28 | End: 2025-03-29 | Stop reason: HOSPADM

## 2025-03-28 RX ORDER — CALCIUM CARBONATE 200(500)MG
500 TABLET,CHEWABLE ORAL 4 TIMES DAILY PRN
Status: DISCONTINUED | OUTPATIENT
Start: 2025-03-28 | End: 2025-03-29 | Stop reason: HOSPADM

## 2025-03-28 RX ORDER — GENTAMICIN SULFATE 1 MG/G
1 OINTMENT TOPICAL DAILY
Status: DISCONTINUED | OUTPATIENT
Start: 2025-03-28 | End: 2025-03-29 | Stop reason: HOSPADM

## 2025-03-28 RX ORDER — POLYETHYLENE GLYCOL 3350 17 G/17G
17 POWDER, FOR SOLUTION ORAL 2 TIMES DAILY PRN
Status: DISCONTINUED | OUTPATIENT
Start: 2025-03-28 | End: 2025-03-29 | Stop reason: HOSPADM

## 2025-03-28 RX ORDER — ONDANSETRON HYDROCHLORIDE 2 MG/ML
4 INJECTION, SOLUTION INTRAVENOUS EVERY 4 HOURS PRN
Status: DISCONTINUED | OUTPATIENT
Start: 2025-03-28 | End: 2025-03-29 | Stop reason: HOSPADM

## 2025-03-28 RX ADMIN — IOHEXOL 90 ML: 350 INJECTION, SOLUTION INTRAVENOUS at 15:52

## 2025-03-28 RX ADMIN — ONDANSETRON 4 MG: 2 INJECTION, SOLUTION INTRAMUSCULAR; INTRAVENOUS at 14:52

## 2025-03-28 RX ADMIN — SODIUM CHLORIDE 1000 ML: 0.9 INJECTION, SOLUTION INTRAVENOUS at 14:53

## 2025-03-28 RX ADMIN — ENOXAPARIN SODIUM 40 MG: 40 INJECTION SUBCUTANEOUS at 22:47

## 2025-03-28 SDOH — HEALTH STABILITY: MENTAL HEALTH: HOW MANY DRINKS CONTAINING ALCOHOL DO YOU HAVE ON A TYPICAL DAY WHEN YOU ARE DRINKING?: 1 OR 2

## 2025-03-28 SDOH — SOCIAL STABILITY: SOCIAL INSECURITY
WITHIN THE LAST YEAR, HAVE YOU BEEN RAPED OR FORCED TO HAVE ANY KIND OF SEXUAL ACTIVITY BY YOUR PARTNER OR EX-PARTNER?: NO

## 2025-03-28 SDOH — ECONOMIC STABILITY: INCOME INSECURITY: IN THE PAST 12 MONTHS HAS THE ELECTRIC, GAS, OIL, OR WATER COMPANY THREATENED TO SHUT OFF SERVICES IN YOUR HOME?: NO

## 2025-03-28 SDOH — ECONOMIC STABILITY: HOUSING INSECURITY: AT ANY TIME IN THE PAST 12 MONTHS, WERE YOU HOMELESS OR LIVING IN A SHELTER (INCLUDING NOW)?: NO

## 2025-03-28 SDOH — SOCIAL STABILITY: SOCIAL INSECURITY: ABUSE: ADULT

## 2025-03-28 SDOH — SOCIAL STABILITY: SOCIAL INSECURITY: HAS ANYONE EVER THREATENED TO HURT YOUR FAMILY OR YOUR PETS?: NO

## 2025-03-28 SDOH — HEALTH STABILITY: MENTAL HEALTH: HOW OFTEN DO YOU HAVE A DRINK CONTAINING ALCOHOL?: MONTHLY OR LESS

## 2025-03-28 SDOH — HEALTH STABILITY: MENTAL HEALTH: HOW OFTEN DO YOU HAVE SIX OR MORE DRINKS ON ONE OCCASION?: LESS THAN MONTHLY

## 2025-03-28 SDOH — HEALTH STABILITY: MENTAL HEALTH
DO YOU FEEL STRESS - TENSE, RESTLESS, NERVOUS, OR ANXIOUS, OR UNABLE TO SLEEP AT NIGHT BECAUSE YOUR MIND IS TROUBLED ALL THE TIME - THESE DAYS?: NOT AT ALL

## 2025-03-28 SDOH — SOCIAL STABILITY: SOCIAL NETWORK: IN A TYPICAL WEEK, HOW MANY TIMES DO YOU TALK ON THE PHONE WITH FAMILY, FRIENDS, OR NEIGHBORS?: NEVER

## 2025-03-28 SDOH — SOCIAL STABILITY: SOCIAL INSECURITY
WITHIN THE LAST YEAR, HAVE YOU BEEN KICKED, HIT, SLAPPED, OR OTHERWISE PHYSICALLY HURT BY YOUR PARTNER OR EX-PARTNER?: NO

## 2025-03-28 SDOH — ECONOMIC STABILITY: HOUSING INSECURITY: IN THE LAST 12 MONTHS, WAS THERE A TIME WHEN YOU WERE NOT ABLE TO PAY THE MORTGAGE OR RENT ON TIME?: NO

## 2025-03-28 SDOH — SOCIAL STABILITY: SOCIAL NETWORK: HOW OFTEN DO YOU GET TOGETHER WITH FRIENDS OR RELATIVES?: NEVER

## 2025-03-28 SDOH — SOCIAL STABILITY: SOCIAL INSECURITY: DO YOU FEEL UNSAFE GOING BACK TO THE PLACE WHERE YOU ARE LIVING?: NO

## 2025-03-28 SDOH — HEALTH STABILITY: PHYSICAL HEALTH
HOW OFTEN DO YOU NEED TO HAVE SOMEONE HELP YOU WHEN YOU READ INSTRUCTIONS, PAMPHLETS, OR OTHER WRITTEN MATERIAL FROM YOUR DOCTOR OR PHARMACY?: NEVER

## 2025-03-28 SDOH — SOCIAL STABILITY: SOCIAL NETWORK: HOW OFTEN DO YOU ATTEND CHURCH OR RELIGIOUS SERVICES?: NEVER

## 2025-03-28 SDOH — ECONOMIC STABILITY: FOOD INSECURITY: WITHIN THE PAST 12 MONTHS, YOU WORRIED THAT YOUR FOOD WOULD RUN OUT BEFORE YOU GOT THE MONEY TO BUY MORE.: NEVER TRUE

## 2025-03-28 SDOH — SOCIAL STABILITY: SOCIAL NETWORK
DO YOU BELONG TO ANY CLUBS OR ORGANIZATIONS SUCH AS CHURCH GROUPS, UNIONS, FRATERNAL OR ATHLETIC GROUPS, OR SCHOOL GROUPS?: NO

## 2025-03-28 SDOH — SOCIAL STABILITY: SOCIAL INSECURITY: WERE YOU ABLE TO COMPLETE ALL THE BEHAVIORAL HEALTH SCREENINGS?: YES

## 2025-03-28 SDOH — SOCIAL STABILITY: SOCIAL INSECURITY: HAVE YOU HAD ANY THOUGHTS OF HARMING ANYONE ELSE?: NO

## 2025-03-28 SDOH — SOCIAL STABILITY: SOCIAL INSECURITY: DO YOU FEEL ANYONE HAS EXPLOITED OR TAKEN ADVANTAGE OF YOU FINANCIALLY OR OF YOUR PERSONAL PROPERTY?: NO

## 2025-03-28 SDOH — ECONOMIC STABILITY: TRANSPORTATION INSECURITY: IN THE PAST 12 MONTHS, HAS LACK OF TRANSPORTATION KEPT YOU FROM MEDICAL APPOINTMENTS OR FROM GETTING MEDICATIONS?: NO

## 2025-03-28 SDOH — ECONOMIC STABILITY: FOOD INSECURITY: WITHIN THE PAST 12 MONTHS, THE FOOD YOU BOUGHT JUST DIDN'T LAST AND YOU DIDN'T HAVE MONEY TO GET MORE.: NEVER TRUE

## 2025-03-28 SDOH — SOCIAL STABILITY: SOCIAL NETWORK: HOW OFTEN DO YOU ATTEND MEETINGS OF THE CLUBS OR ORGANIZATIONS YOU BELONG TO?: NEVER

## 2025-03-28 SDOH — SOCIAL STABILITY: SOCIAL INSECURITY: WITHIN THE LAST YEAR, HAVE YOU BEEN AFRAID OF YOUR PARTNER OR EX-PARTNER?: NO

## 2025-03-28 SDOH — SOCIAL STABILITY: SOCIAL INSECURITY: WITHIN THE LAST YEAR, HAVE YOU BEEN HUMILIATED OR EMOTIONALLY ABUSED IN OTHER WAYS BY YOUR PARTNER OR EX-PARTNER?: NO

## 2025-03-28 SDOH — HEALTH STABILITY: PHYSICAL HEALTH: ON AVERAGE, HOW MANY MINUTES DO YOU ENGAGE IN EXERCISE AT THIS LEVEL?: 0 MIN

## 2025-03-28 SDOH — HEALTH STABILITY: PHYSICAL HEALTH: ON AVERAGE, HOW MANY DAYS PER WEEK DO YOU ENGAGE IN MODERATE TO STRENUOUS EXERCISE (LIKE A BRISK WALK)?: 0 DAYS

## 2025-03-28 SDOH — ECONOMIC STABILITY: FOOD INSECURITY: HOW HARD IS IT FOR YOU TO PAY FOR THE VERY BASICS LIKE FOOD, HOUSING, MEDICAL CARE, AND HEATING?: NOT VERY HARD

## 2025-03-28 SDOH — SOCIAL STABILITY: SOCIAL INSECURITY: ARE YOU MARRIED, WIDOWED, DIVORCED, SEPARATED, NEVER MARRIED, OR LIVING WITH A PARTNER?: MARRIED

## 2025-03-28 SDOH — SOCIAL STABILITY: SOCIAL INSECURITY: ARE THERE ANY APPARENT SIGNS OF INJURIES/BEHAVIORS THAT COULD BE RELATED TO ABUSE/NEGLECT?: NO

## 2025-03-28 SDOH — SOCIAL STABILITY: SOCIAL INSECURITY: ARE YOU OR HAVE YOU BEEN THREATENED OR ABUSED PHYSICALLY, EMOTIONALLY, OR SEXUALLY BY ANYONE?: NO

## 2025-03-28 SDOH — ECONOMIC STABILITY: HOUSING INSECURITY: IN THE PAST 12 MONTHS, HOW MANY TIMES HAVE YOU MOVED WHERE YOU WERE LIVING?: 0

## 2025-03-28 SDOH — SOCIAL STABILITY: SOCIAL INSECURITY: DOES ANYONE TRY TO KEEP YOU FROM HAVING/CONTACTING OTHER FRIENDS OR DOING THINGS OUTSIDE YOUR HOME?: NO

## 2025-03-28 SDOH — SOCIAL STABILITY: SOCIAL INSECURITY: HAVE YOU HAD THOUGHTS OF HARMING ANYONE ELSE?: NO

## 2025-03-28 ASSESSMENT — ACTIVITIES OF DAILY LIVING (ADL)
PATIENT'S MEMORY ADEQUATE TO SAFELY COMPLETE DAILY ACTIVITIES?: YES
HEARING - RIGHT EAR: FUNCTIONAL
LACK_OF_TRANSPORTATION: NO
BATHING: INDEPENDENT
TOILETING: INDEPENDENT
FEEDING YOURSELF: INDEPENDENT
WALKS IN HOME: INDEPENDENT
HEARING - LEFT EAR: FUNCTIONAL
JUDGMENT_ADEQUATE_SAFELY_COMPLETE_DAILY_ACTIVITIES: YES
LACK_OF_TRANSPORTATION: NO
FEEDING YOURSELF: INDEPENDENT
DRESSING YOURSELF: INDEPENDENT
ADEQUATE_TO_COMPLETE_ADL: YES
GROOMING: INDEPENDENT
BATHING: INDEPENDENT
JUDGMENT_ADEQUATE_SAFELY_COMPLETE_DAILY_ACTIVITIES: YES
TOILETING: INDEPENDENT
PATIENT'S MEMORY ADEQUATE TO SAFELY COMPLETE DAILY ACTIVITIES?: YES
ADEQUATE_TO_COMPLETE_ADL: YES
HEARING - LEFT EAR: FUNCTIONAL
HEARING - RIGHT EAR: FUNCTIONAL
GROOMING: INDEPENDENT
DRESSING YOURSELF: INDEPENDENT
WALKS IN HOME: INDEPENDENT

## 2025-03-28 ASSESSMENT — COGNITIVE AND FUNCTIONAL STATUS - GENERAL
PATIENT BASELINE BEDBOUND: NO
MOBILITY SCORE: 24
MOBILITY SCORE: 24
DAILY ACTIVITIY SCORE: 24
PATIENT BASELINE BEDBOUND: NO

## 2025-03-28 ASSESSMENT — PAIN DESCRIPTION - ORIENTATION: ORIENTATION: LEFT

## 2025-03-28 ASSESSMENT — LIFESTYLE VARIABLES
HOW OFTEN DO YOU HAVE A DRINK CONTAINING ALCOHOL: NEVER
SKIP TO QUESTIONS 9-10: 0
HOW OFTEN DO YOU HAVE A DRINK CONTAINING ALCOHOL: NEVER
HOW MANY STANDARD DRINKS CONTAINING ALCOHOL DO YOU HAVE ON A TYPICAL DAY: 1 OR 2
AUDIT-C TOTAL SCORE: 1
AUDIT-C TOTAL SCORE: 2
SKIP TO QUESTIONS 9-10: 0
HOW OFTEN DO YOU HAVE A DRINK CONTAINING ALCOHOL: NEVER
HOW OFTEN DO YOU HAVE 6 OR MORE DRINKS ON ONE OCCASION: LESS THAN MONTHLY
AUDIT-C TOTAL SCORE: 1

## 2025-03-28 ASSESSMENT — PATIENT HEALTH QUESTIONNAIRE - PHQ9
1. LITTLE INTEREST OR PLEASURE IN DOING THINGS: NOT AT ALL
SUM OF ALL RESPONSES TO PHQ9 QUESTIONS 1 & 2: 0
2. FEELING DOWN, DEPRESSED OR HOPELESS: NOT AT ALL

## 2025-03-28 ASSESSMENT — COLUMBIA-SUICIDE SEVERITY RATING SCALE - C-SSRS
6. HAVE YOU EVER DONE ANYTHING, STARTED TO DO ANYTHING, OR PREPARED TO DO ANYTHING TO END YOUR LIFE?: NO
2. HAVE YOU ACTUALLY HAD ANY THOUGHTS OF KILLING YOURSELF?: NO
1. IN THE PAST MONTH, HAVE YOU WISHED YOU WERE DEAD OR WISHED YOU COULD GO TO SLEEP AND NOT WAKE UP?: NO

## 2025-03-28 ASSESSMENT — PAIN DESCRIPTION - LOCATION: LOCATION: HAND

## 2025-03-28 ASSESSMENT — PAIN SCALES - GENERAL
PAINLEVEL_OUTOF10: 0 - NO PAIN
PAINLEVEL_OUTOF10: 8

## 2025-03-28 ASSESSMENT — PAIN - FUNCTIONAL ASSESSMENT: PAIN_FUNCTIONAL_ASSESSMENT: 0-10

## 2025-03-28 ASSESSMENT — PAIN DESCRIPTION - PAIN TYPE: TYPE: ACUTE PAIN

## 2025-03-28 NOTE — PROGRESS NOTES
Pharmacy Medication History     Source of Information: Patient and wife bedside    Additional concerns with the patient's PTA list.   N/A  Notified Provider via Haiku : No    The following updates were made to the Prior to Admission medication list:     Medications ADDED:   Gentamicin 0.1% ointment 15  gr  Medications CHANGED:  N/A  Medications REMOVED:   N/A  Medications NOT TAKING:   N/A    Allergy reviewed : Yes    Meds 2 Beds : Yes    Outpatient pharmacy confirmed and updated in chart : Yes    Pharm[facility] name: [facility] [id number] [address] [facility].    The list below reflectives the updated PTA list. Please review each medication in order reconciliation for additional clarification and justification.    Prior to Admission Medications   Prescriptions Last Dose Informant   amLODIPine (Norvasc) 5 mg tablet 3/27/2025 Evening    Sig: TAKE 1 TABLET ONCE DAILY   gentamicin (Garamycin) 0.1 % ointment 3/27/2025 Evening    Sig: Apply 1 Application topically once daily. Apply to biopsy site on right heel and cover with bandage      Facility-Administered Medications: None       The list below reflectives the updated allergy list. Please review each documented allergy for additional clarification and justification.    No Known Allergies       03/28/25 at 7:15 PM - Cristine Millan
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
## 2025-03-29 VITALS
RESPIRATION RATE: 18 BRPM | TEMPERATURE: 98.4 F | HEIGHT: 69 IN | HEART RATE: 61 BPM | BODY MASS INDEX: 32.58 KG/M2 | SYSTOLIC BLOOD PRESSURE: 154 MMHG | WEIGHT: 220 LBS | OXYGEN SATURATION: 97 % | DIASTOLIC BLOOD PRESSURE: 85 MMHG

## 2025-03-29 PROBLEM — M54.50 LOW BACK PAIN: Status: ACTIVE | Noted: 2025-03-29

## 2025-03-29 PROBLEM — M65.4 DE QUERVAIN'S TENOSYNOVITIS, RIGHT: Status: ACTIVE | Noted: 2025-03-29

## 2025-03-29 PROBLEM — H90.3 BILATERAL SENSORINEURAL HEARING LOSS: Status: ACTIVE | Noted: 2025-03-29

## 2025-03-29 PROBLEM — M25.569 KNEE PAIN: Status: ACTIVE | Noted: 2025-03-29

## 2025-03-29 PROBLEM — N41.9 PROSTATITIS: Status: ACTIVE | Noted: 2025-03-29

## 2025-03-29 PROBLEM — M77.11 RIGHT LATERAL EPICONDYLITIS: Status: ACTIVE | Noted: 2025-03-29

## 2025-03-29 PROBLEM — R10.31 RIGHT INGUINAL PAIN: Status: ACTIVE | Noted: 2025-03-29

## 2025-03-29 PROBLEM — I10 BENIGN ESSENTIAL HTN: Status: ACTIVE | Noted: 2025-03-29

## 2025-03-29 PROBLEM — R61 NIGHT SWEATS: Status: ACTIVE | Noted: 2025-03-29

## 2025-03-29 PROBLEM — R06.6 INTRACTABLE HICCOUGHS: Status: ACTIVE | Noted: 2025-03-29

## 2025-03-29 PROBLEM — R97.20 HIGH PROSTATE SPECIFIC ANTIGEN (PSA): Status: ACTIVE | Noted: 2025-03-29

## 2025-03-29 PROBLEM — R97.20 BPH WITH ELEVATED PSA: Status: ACTIVE | Noted: 2025-03-29

## 2025-03-29 PROBLEM — M84.376A METATARSAL STRESS FRACTURE: Status: ACTIVE | Noted: 2025-03-29

## 2025-03-29 PROBLEM — M65.4 RADIAL STYLOID TENOSYNOVITIS OF RIGHT HAND: Status: ACTIVE | Noted: 2025-03-29

## 2025-03-29 PROBLEM — M21.40 PES PLANUS: Status: ACTIVE | Noted: 2025-03-29

## 2025-03-29 PROBLEM — M79.673 FOOT PAIN: Status: ACTIVE | Noted: 2025-03-29

## 2025-03-29 PROBLEM — N40.0 PROSTATISM: Status: ACTIVE | Noted: 2025-03-29

## 2025-03-29 PROBLEM — H91.90 HEARING LOSS: Status: ACTIVE | Noted: 2025-03-29

## 2025-03-29 PROBLEM — M75.52 SUBACROMIAL BURSITIS OF LEFT SHOULDER JOINT: Status: ACTIVE | Noted: 2025-03-29

## 2025-03-29 PROBLEM — R97.20 ELEVATED PSA: Status: ACTIVE | Noted: 2025-03-29

## 2025-03-29 PROBLEM — H25.13 AGE-RELATED NUCLEAR CATARACT, BILATERAL: Status: ACTIVE | Noted: 2024-11-19

## 2025-03-29 PROBLEM — M25.871 ANKLE IMPINGEMENT SYNDROME, RIGHT: Status: ACTIVE | Noted: 2025-03-29

## 2025-03-29 PROBLEM — N40.0 BPH WITH ELEVATED PSA: Status: ACTIVE | Noted: 2025-03-29

## 2025-03-29 PROBLEM — E78.49 ESSENTIAL FAMILIAL HYPERLIPIDEMIA: Status: ACTIVE | Noted: 2025-03-29

## 2025-03-29 PROBLEM — M67.00 ACQUIRED SHORT ACHILLES TENDON: Status: ACTIVE | Noted: 2025-03-29

## 2025-03-29 PROBLEM — M19.071 ARTHRITIS OF ANKLE, RIGHT: Status: ACTIVE | Noted: 2025-03-29

## 2025-03-29 LAB
ANION GAP SERPL CALC-SCNC: 13 MMOL/L (ref 10–20)
BASOPHILS # BLD AUTO: 0.02 X10*3/UL (ref 0–0.1)
BASOPHILS NFR BLD AUTO: 0.2 %
BUN SERPL-MCNC: 20 MG/DL (ref 6–23)
CALCIUM SERPL-MCNC: 9 MG/DL (ref 8.6–10.3)
CHLORIDE SERPL-SCNC: 109 MMOL/L (ref 98–107)
CO2 SERPL-SCNC: 21 MMOL/L (ref 21–32)
CREAT SERPL-MCNC: 1.37 MG/DL (ref 0.5–1.3)
EGFRCR SERPLBLD CKD-EPI 2021: 57 ML/MIN/1.73M*2
EOSINOPHIL # BLD AUTO: 0.05 X10*3/UL (ref 0–0.7)
EOSINOPHIL NFR BLD AUTO: 0.5 %
ERYTHROCYTE [DISTWIDTH] IN BLOOD BY AUTOMATED COUNT: 13.7 % (ref 11.5–14.5)
GLUCOSE SERPL-MCNC: 108 MG/DL (ref 74–99)
HCT VFR BLD AUTO: 41.8 % (ref 41–52)
HGB BLD-MCNC: 14.5 G/DL (ref 13.5–17.5)
IMM GRANULOCYTES # BLD AUTO: 0.03 X10*3/UL (ref 0–0.7)
IMM GRANULOCYTES NFR BLD AUTO: 0.3 % (ref 0–0.9)
LYMPHOCYTES # BLD AUTO: 0.67 X10*3/UL (ref 1.2–4.8)
LYMPHOCYTES NFR BLD AUTO: 6.9 %
MCH RBC QN AUTO: 28.8 PG (ref 26–34)
MCHC RBC AUTO-ENTMCNC: 34.7 G/DL (ref 32–36)
MCV RBC AUTO: 83 FL (ref 80–100)
MONOCYTES # BLD AUTO: 0.56 X10*3/UL (ref 0.1–1)
MONOCYTES NFR BLD AUTO: 5.8 %
NEUTROPHILS # BLD AUTO: 8.38 X10*3/UL (ref 1.2–7.7)
NEUTROPHILS NFR BLD AUTO: 86.3 %
NRBC BLD-RTO: 0 /100 WBCS (ref 0–0)
PLATELET # BLD AUTO: 209 X10*3/UL (ref 150–450)
POTASSIUM SERPL-SCNC: 4.2 MMOL/L (ref 3.5–5.3)
RBC # BLD AUTO: 5.03 X10*6/UL (ref 4.5–5.9)
SODIUM SERPL-SCNC: 139 MMOL/L (ref 136–145)
WBC # BLD AUTO: 9.7 X10*3/UL (ref 4.4–11.3)

## 2025-03-29 PROCEDURE — G0378 HOSPITAL OBSERVATION PER HR: HCPCS

## 2025-03-29 PROCEDURE — 87329 GIARDIA AG IA: CPT

## 2025-03-29 PROCEDURE — 87328 CRYPTOSPORIDIUM AG IA: CPT

## 2025-03-29 PROCEDURE — 80048 BASIC METABOLIC PNL TOTAL CA: CPT | Performed by: HOSPITALIST

## 2025-03-29 PROCEDURE — 36415 COLL VENOUS BLD VENIPUNCTURE: CPT | Performed by: HOSPITALIST

## 2025-03-29 PROCEDURE — 99223 1ST HOSP IP/OBS HIGH 75: CPT | Performed by: PSYCHIATRY & NEUROLOGY

## 2025-03-29 PROCEDURE — 87506 IADNA-DNA/RNA PROBE TQ 6-11: CPT | Mod: AHULAB

## 2025-03-29 PROCEDURE — 85025 COMPLETE CBC W/AUTO DIFF WBC: CPT | Performed by: HOSPITALIST

## 2025-03-29 PROCEDURE — 2500000005 HC RX 250 GENERAL PHARMACY W/O HCPCS: Performed by: HOSPITALIST

## 2025-03-29 PROCEDURE — 2500000001 HC RX 250 WO HCPCS SELF ADMINISTERED DRUGS (ALT 637 FOR MEDICARE OP): Performed by: HOSPITALIST

## 2025-03-29 PROCEDURE — 99238 HOSP IP/OBS DSCHRG MGMT 30/<: CPT

## 2025-03-29 PROCEDURE — 87493 C DIFF AMPLIFIED PROBE: CPT | Mod: 59,AHULAB

## 2025-03-29 RX ORDER — AMLODIPINE BESYLATE 5 MG/1
5 TABLET ORAL DAILY
Qty: 90 TABLET | Refills: 0 | Status: SHIPPED | OUTPATIENT
Start: 2025-03-29

## 2025-03-29 RX ORDER — OXYCODONE HYDROCHLORIDE 5 MG/1
5 TABLET ORAL EVERY 4 HOURS PRN
Status: DISCONTINUED | OUTPATIENT
Start: 2025-03-29 | End: 2025-03-29 | Stop reason: HOSPADM

## 2025-03-29 RX ORDER — AMLODIPINE BESYLATE 5 MG/1
5 TABLET ORAL NIGHTLY
Status: CANCELLED | OUTPATIENT
Start: 2025-03-28

## 2025-03-29 RX ADMIN — GENTAMICIN SULFATE 1 APPLICATION: 1 OINTMENT TOPICAL at 00:25

## 2025-03-29 RX ADMIN — ACETAMINOPHEN 650 MG: 325 TABLET, FILM COATED ORAL at 00:58

## 2025-03-29 RX ADMIN — AMLODIPINE BESYLATE 5 MG: 5 TABLET ORAL at 09:16

## 2025-03-29 RX ADMIN — ACETAMINOPHEN 650 MG: 325 TABLET, FILM COATED ORAL at 09:16

## 2025-03-29 ASSESSMENT — PAIN - FUNCTIONAL ASSESSMENT
PAIN_FUNCTIONAL_ASSESSMENT: 0-10

## 2025-03-29 ASSESSMENT — COGNITIVE AND FUNCTIONAL STATUS - GENERAL
MOBILITY SCORE: 24
DAILY ACTIVITIY SCORE: 24

## 2025-03-29 ASSESSMENT — PAIN DESCRIPTION - ORIENTATION: ORIENTATION: LEFT

## 2025-03-29 ASSESSMENT — PAIN SCALES - GENERAL
PAINLEVEL_OUTOF10: 3
PAINLEVEL_OUTOF10: 4

## 2025-03-29 ASSESSMENT — ACTIVITIES OF DAILY LIVING (ADL): LACK_OF_TRANSPORTATION: NO

## 2025-03-29 ASSESSMENT — PAIN DESCRIPTION - LOCATION
LOCATION: HAND
LOCATION: CHEST
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
## 2025-03-29 NOTE — PROGRESS NOTES
03/29/25 1011   Discharge Planning   Living Arrangements Spouse/significant other   Support Systems Spouse/significant other   Type of Residence Private residence   Number of Stairs to Enter Residence 2   Number of Stairs Within Residence 14   Do you have animals or pets at home? Yes   Type of Animals or Pets dog   Expected Discharge Disposition Home   Does the patient need discharge transport arranged? No   Financial Resource Strain   How hard is it for you to pay for the very basics like food, housing, medical care, and heating? Not very   Housing Stability   In the last 12 months, was there a time when you were not able to pay the mortgage or rent on time? N   At any time in the past 12 months, were you homeless or living in a shelter (including now)? N   Transportation Needs   In the past 12 months, has lack of transportation kept you from medical appointments or from getting medications? no   In the past 12 months, has lack of transportation kept you from meetings, work, or from getting things needed for daily living? No   Intensity of Service   Intensity of Service 0-30 min     TCC met with pt at bedside. Explained my role as discharge planner. Lives with wife. Independent with ADLs and drives. Pt has no home going needs.
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
## 2025-03-29 NOTE — CARE PLAN
The patient's goals for the shift include      The clinical goals for the shift include for patient to be pain free      Problem: Pain - Adult  Goal: Verbalizes/displays adequate comfort level or baseline comfort level  Outcome: Progressing     Problem: Safety - Adult  Goal: Free from fall injury  Outcome: Progressing

## 2025-03-29 NOTE — CARE PLAN
The patient's goals for the shift include      The clinical goals for the shift include for patient to be pain free

## 2025-03-29 NOTE — CARE PLAN
The patient's goals for the shift include  discharge.    The clinical goals for the shift include for patient to be pain free    Over the shift, the patient did not make progress toward the following goals. Barriers to progression include fractured left finger. Recommendations to address these barriers include ortho consult.

## 2025-03-30 LAB
C COLI+JEJ+UPSA DNA STL QL NAA+PROBE: NOT DETECTED
C DIF TOX TCDA+TCDB STL QL NAA+PROBE: NOT DETECTED
EC STX1 GENE STL QL NAA+PROBE: NOT DETECTED
EC STX2 GENE STL QL NAA+PROBE: NOT DETECTED
NOROVIRUS GI + GII RNA STL NAA+PROBE: DETECTED
RV RNA STL NAA+PROBE: NOT DETECTED
SALMONELLA DNA STL QL NAA+PROBE: NOT DETECTED
SHIGELLA DNA SPEC QL NAA+PROBE: NOT DETECTED
V CHOLERAE DNA STL QL NAA+PROBE: NOT DETECTED
Y ENTEROCOL DNA STL QL NAA+PROBE: NOT DETECTED

## 2025-03-31 LAB
ATRIAL RATE: 55 BPM
P AXIS: 60 DEGREES
P OFFSET: 197 MS
P ONSET: 138 MS
PR INTERVAL: 176 MS
Q ONSET: 226 MS
QRS COUNT: 9 BEATS
QRS DURATION: 110 MS
QT INTERVAL: 420 MS
QTC CALCULATION(BAZETT): 401 MS
QTC FREDERICIA: 408 MS
R AXIS: 42 DEGREES
T AXIS: 15 DEGREES
T OFFSET: 436 MS
VENTRICULAR RATE: 55 BPM

## 2025-04-01 ENCOUNTER — OFFICE VISIT (OUTPATIENT)
Dept: ORTHOPEDIC SURGERY | Facility: HOSPITAL | Age: 70
End: 2025-04-01
Payer: COMMERCIAL

## 2025-04-01 DIAGNOSIS — S62.337A CLOSED DISPLACED FRACTURE OF NECK OF FIFTH METACARPAL BONE OF LEFT HAND, INITIAL ENCOUNTER: Primary | ICD-10-CM

## 2025-04-01 LAB
CRYPTOSP AG STL QL IA: NEGATIVE
G LAMBLIA AG STL QL IA: NEGATIVE

## 2025-04-01 PROCEDURE — 99214 OFFICE O/P EST MOD 30 MIN: CPT | Performed by: STUDENT IN AN ORGANIZED HEALTH CARE EDUCATION/TRAINING PROGRAM

## 2025-04-01 PROCEDURE — 1159F MED LIST DOCD IN RCRD: CPT | Performed by: STUDENT IN AN ORGANIZED HEALTH CARE EDUCATION/TRAINING PROGRAM

## 2025-04-01 PROCEDURE — 1125F AMNT PAIN NOTED PAIN PRSNT: CPT | Performed by: STUDENT IN AN ORGANIZED HEALTH CARE EDUCATION/TRAINING PROGRAM

## 2025-04-01 ASSESSMENT — PAIN - FUNCTIONAL ASSESSMENT: PAIN_FUNCTIONAL_ASSESSMENT: 0-10

## 2025-04-01 ASSESSMENT — PAIN SCALES - GENERAL: PAINLEVEL_OUTOF10: 1

## 2025-04-01 NOTE — PROGRESS NOTES
History of Present Illness   Patient presents today for evaluation of side: left upper extremity pain.    The patient sustained an acute injury on  3/28/2025 .   The patient denies any loss of consciousness or additional significant injuries.  The patient denies any current numbness or tingling.  The pain is sharp, acute in nature, better with rest worse with motion.     Past Medical History:   Diagnosis Date    Benign prostatic hyperplasia without lower urinary tract symptoms 12/07/2020    Prostatism       Medication Documentation Review Audit       Reviewed by Doreen Latham LPN (Licensed Nurse) on 04/01/25 at 0949      Medication Order Taking? Sig Documenting Provider Last Dose Status   Discontinued 03/29/25 0653   amLODIPine (Norvasc) 5 mg tablet [id number]  TAKE 1 TABLET ONCE DAILY Nj Kwon MD  Active   gentamicin (Garamycin) 0.1 % ointment [id number] No Apply 1 Application topically once daily. Apply to biopsy site on right heel and cover with bandage Historical Provider, MD 3/27/2025 Evening Active                    No Known Allergies    Social History     Socioeconomic History    Marital status:      Spouse name: Not on file    Number of children: Not on file    Years of education: Not on file    Highest education level: Not on file   Occupational History    Not on file   Tobacco Use    Smoking status: Never    Smokeless tobacco: Never   Vaping Use    Vaping status: Not on file   Substance and Sexual Activity    Alcohol use: Not on file    Drug use: Not on file    Sexual activity: Not on file   Other Topics Concern    Not on file   Social History Narrative    Not on file     Social Drivers of Health     Financial Resource Strain: Low Risk  (3/29/2025)    Overall Financial Resource Strain (CARDIA)     Difficulty of Paying Living Expenses: Not very hard   Food Insecurity: No Food Insecurity (3/28/2025)    Hunger Vital Sign     Worried About Running Out of Food in the Last Year: Never true      Ran Out of Food in the Last Year: Never true   Transportation Needs: No Transportation Needs (3/29/2025)    PRAPARE - Transportation     Lack of Transportation (Medical): No     Lack of Transportation (Non-Medical): No   Physical Activity: Inactive (3/28/2025)    Exercise Vital Sign     Days of Exercise per Week: 0 days     Minutes of Exercise per Session: 0 min   Stress: No Stress Concern Present (3/28/2025)    [employer] - Occupational Stress Questionnaire     Feeling of Stress : Not at all   Social Connections: Socially Isolated (3/28/2025)    Social Connection and Isolation Panel [NHANES]     Frequency of Communication with Friends and Family: Never     Frequency of Social Gatherings with Friends and Family: Never     Attends Adventism Services: Never     Active Member of Clubs or Organizations: No     Attends Club or Organization Meetings: Never     Marital Status:    Intimate Partner Violence: Not At Risk (3/28/2025)    Humiliation, Afraid, Rape, and Kick questionnaire     Fear of Current or Ex-Partner: No     Emotionally Abused: No     Physically Abused: No     Sexually Abused: No   Housing Stability: Low Risk  (3/29/2025)    Housing Stability Vital Sign     Unable to Pay for Housing in the Last Year: No     Number of Times Moved in the Last Year: 0     Homeless in the Last Year: No       No past surgical history on file.       Review of Systems   GENERAL: Negative  GI: Negative  MUSCULOSKELETAL: See HPI  SKIN: Negative  NEURO:  Negative     Physical Exam:  side: left upper extremity:  Skin healthy to gross inspection, no breakdown  Swelling / ecchymosis noted  Tender palpation over the fifth metacarpal neck.  No rotational deformity when he makes a composite fist.  Intact flexion and extension of 1st IP joint and finger abduction  Sensation intact to light touch medial / ulnar and radial nerve distribution   Good cap refill     Imaging  XR of the left hand was reviewed in  office today.  X-rays are dated 3/28/2025  There is 1/5 metacarpal neck fracture with mild translation and angular displacement.     Assessment   Patient with an acute side: left fifth metacarpal neck fracture      Plan:  Patient still with considerable swelling.  At this time we will convert him back to a plaster splint.  Will see him back in 1 week with repeat x-rays.  Discussed with the patient that this fracture is amenable to non-surgical management.  Discussed a period of immobilization and serial radiographs followed by rehab and return to activities.     Follow-up 1 week with repeat x-rays of the left hand

## 2025-04-05 DIAGNOSIS — R79.89 ELEVATED SERUM CREATININE: ICD-10-CM

## 2025-04-07 LAB — O+P STL MICRO: NEGATIVE

## 2025-04-08 ENCOUNTER — HOSPITAL ENCOUNTER (OUTPATIENT)
Dept: RADIOLOGY | Facility: HOSPITAL | Age: 70
Discharge: HOME | End: 2025-04-08
Payer: COMMERCIAL

## 2025-04-08 ENCOUNTER — OFFICE VISIT (OUTPATIENT)
Dept: ORTHOPEDIC SURGERY | Facility: HOSPITAL | Age: 70
End: 2025-04-08
Payer: COMMERCIAL

## 2025-04-08 DIAGNOSIS — S62.337A CLOSED DISPLACED FRACTURE OF NECK OF FIFTH METACARPAL BONE OF LEFT HAND, INITIAL ENCOUNTER: ICD-10-CM

## 2025-04-08 DIAGNOSIS — S62.337A CLOSED DISPLACED FRACTURE OF NECK OF FIFTH METACARPAL BONE OF LEFT HAND, INITIAL ENCOUNTER: Primary | ICD-10-CM

## 2025-04-08 PROCEDURE — 73130 X-RAY EXAM OF HAND: CPT | Mod: LT

## 2025-04-08 PROCEDURE — L3924 HFO WITHOUT JOINTS PRE OTS: HCPCS | Performed by: STUDENT IN AN ORGANIZED HEALTH CARE EDUCATION/TRAINING PROGRAM

## 2025-04-08 PROCEDURE — 73130 X-RAY EXAM OF HAND: CPT | Mod: LEFT SIDE

## 2025-04-08 PROCEDURE — 99213 OFFICE O/P EST LOW 20 MIN: CPT | Performed by: STUDENT IN AN ORGANIZED HEALTH CARE EDUCATION/TRAINING PROGRAM

## 2025-04-08 PROCEDURE — 1036F TOBACCO NON-USER: CPT | Performed by: STUDENT IN AN ORGANIZED HEALTH CARE EDUCATION/TRAINING PROGRAM

## 2025-04-08 PROCEDURE — 1125F AMNT PAIN NOTED PAIN PRSNT: CPT | Performed by: STUDENT IN AN ORGANIZED HEALTH CARE EDUCATION/TRAINING PROGRAM

## 2025-04-08 PROCEDURE — 1159F MED LIST DOCD IN RCRD: CPT | Performed by: STUDENT IN AN ORGANIZED HEALTH CARE EDUCATION/TRAINING PROGRAM

## 2025-04-08 ASSESSMENT — PAIN - FUNCTIONAL ASSESSMENT: PAIN_FUNCTIONAL_ASSESSMENT: 0-10

## 2025-04-08 ASSESSMENT — PAIN SCALES - GENERAL: PAINLEVEL_OUTOF10: 2

## 2025-04-08 NOTE — PROGRESS NOTES
History of Present Illness   Patient presents today for evaluation of side: left upper extremity pain.    The patient sustained an acute injury on  3/28/2025 .   The patient denies any loss of consciousness or additional significant injuries.  The patient denies any current numbness or tingling.  The pain is sharp, acute in nature, better with rest worse with motion.    4/8/2025 follow-up:  Patient has been in a splint since last visit.  He is continue to try to elevate.  His pain has been well-controlled.  Currently notes it is a 2 out of 10.  Denies numbness tingling.     Past Medical History:   Diagnosis Date    Benign prostatic hyperplasia without lower urinary tract symptoms 12/07/2020    Prostatism       Medication Documentation Review Audit       Reviewed by Doreen Latham LPN (Licensed Nurse) on 04/08/25 at 1048      Medication Order Taking? Sig Documenting Provider Last Dose Status   amLODIPine (Norvasc) 5 mg tablet [id number]  TAKE 1 TABLET ONCE DAILY Nj Kwon MD  Active   gentamicin (Garamycin) 0.1 % ointment [id number] No Apply 1 Application topically once daily. Apply to biopsy site on right heel and cover with bandage Historical Provider, MD 3/27/2025 Evening Active                    No Known Allergies    Social History     Socioeconomic History    Marital status:      Spouse name: Not on file    Number of children: Not on file    Years of education: Not on file    Highest education level: Not on file   Occupational History    Not on file   Tobacco Use    Smoking status: Never    Smokeless tobacco: Never   Vaping Use    Vaping status: Not on file   Substance and Sexual Activity    Alcohol use: Not on file    Drug use: Not on file    Sexual activity: Not on file   Other Topics Concern    Not on file   Social History Narrative    Not on file     Social Drivers of Health     Financial Resource Strain: Low Risk  (3/29/2025)    Overall Financial Resource Strain (CARDIA)     Difficulty of  Paying Living Expenses: Not very hard   Food Insecurity: No Food Insecurity (3/28/2025)    Hunger Vital Sign     Worried About Running Out of Food in the Last Year: Never true     Ran Out of Food in the Last Year: Never true   Transportation Needs: No Transportation Needs (3/29/2025)    PRAPARE - Transportation     Lack of Transportation (Medical): No     Lack of Transportation (Non-Medical): No   Physical Activity: Inactive (3/28/2025)    Exercise Vital Sign     Days of Exercise per Week: 0 days     Minutes of Exercise per Session: 0 min   Stress: No Stress Concern Present (3/28/2025)    [place] citizen [place] [employer] - Occupational Stress Questionnaire     Feeling of Stress : Not at all   Social Connections: Socially Isolated (3/28/2025)    Social Connection and Isolation Panel [NHANES]     Frequency of Communication with Friends and Family: Never     Frequency of Social Gatherings with Friends and Family: Never     Attends Zoroastrian Services: Never     Active Member of Clubs or Organizations: No     Attends Club or Organization Meetings: Never     Marital Status:    Intimate Partner Violence: Not At Risk (3/28/2025)    Humiliation, Afraid, Rape, and Kick questionnaire     Fear of Current or Ex-Partner: No     Emotionally Abused: No     Physically Abused: No     Sexually Abused: No   Housing Stability: Low Risk  (3/29/2025)    Housing Stability Vital Sign     Unable to Pay for Housing in the Last Year: No     Number of Times Moved in the Last Year: 0     Homeless in the Last Year: No       No past surgical history on file.       Review of Systems   GENERAL: Negative  GI: Negative  MUSCULOSKELETAL: See HPI  SKIN: Negative  NEURO:  Negative     Physical Exam:  side: left upper extremity:  Splint removed  Skin healthy to gross inspection, no breakdown  Swelling / ecchymosis noted  Tender palpation over the fifth metacarpal neck.  No rotational deformity when he makes a composite fist.  Intact flexion  and extension of 1st IP joint and finger abduction  Sensation intact to light touch medial / ulnar and radial nerve distribution   Good cap refill     Imaging  XR of the left hand was taken and reviewed in office today.  X-rays dated 4/8/2025 and compared to x-rays dated 3/28/2025  There is 5th metacarpal neck fracture with mild translation and angular displacement.     Assessment   Patient with an acute side: left fifth metacarpal neck fracture      Plan:  Patient swelling is considerably improved.  X-rays demonstrate no interval change in alignment.  At this point we will convert him to a hand-based ulnar gutter Exos brace.  He may come out of this for hygiene purposes and gentle range of motion.  He will follow-up in 2 weeks     Follow-up 2 weeks with repeat x-rays of the left hand

## 2025-04-22 ENCOUNTER — HOSPITAL ENCOUNTER (OUTPATIENT)
Dept: RADIOLOGY | Facility: HOSPITAL | Age: 70
Discharge: HOME | End: 2025-04-22
Payer: COMMERCIAL

## 2025-04-22 ENCOUNTER — OFFICE VISIT (OUTPATIENT)
Dept: ORTHOPEDIC SURGERY | Facility: HOSPITAL | Age: 70
End: 2025-04-22
Payer: COMMERCIAL

## 2025-04-22 DIAGNOSIS — S62.337A CLOSED DISPLACED FRACTURE OF NECK OF FIFTH METACARPAL BONE OF LEFT HAND, INITIAL ENCOUNTER: Primary | ICD-10-CM

## 2025-04-22 DIAGNOSIS — S62.337A CLOSED DISPLACED FRACTURE OF NECK OF FIFTH METACARPAL BONE OF LEFT HAND, INITIAL ENCOUNTER: ICD-10-CM

## 2025-04-22 DIAGNOSIS — M65.332 TRIGGER FINGER, LEFT MIDDLE FINGER: ICD-10-CM

## 2025-04-22 PROCEDURE — 20550 NJX 1 TENDON SHEATH/LIGAMENT: CPT | Mod: LT | Performed by: STUDENT IN AN ORGANIZED HEALTH CARE EDUCATION/TRAINING PROGRAM

## 2025-04-22 PROCEDURE — 73130 X-RAY EXAM OF HAND: CPT | Mod: LEFT SIDE | Performed by: RADIOLOGY

## 2025-04-22 PROCEDURE — 1159F MED LIST DOCD IN RCRD: CPT | Performed by: STUDENT IN AN ORGANIZED HEALTH CARE EDUCATION/TRAINING PROGRAM

## 2025-04-22 PROCEDURE — 1036F TOBACCO NON-USER: CPT | Performed by: STUDENT IN AN ORGANIZED HEALTH CARE EDUCATION/TRAINING PROGRAM

## 2025-04-22 PROCEDURE — 73130 X-RAY EXAM OF HAND: CPT | Mod: LT

## 2025-04-22 PROCEDURE — 2500000004 HC RX 250 GENERAL PHARMACY W/ HCPCS (ALT 636 FOR OP/ED): Performed by: STUDENT IN AN ORGANIZED HEALTH CARE EDUCATION/TRAINING PROGRAM

## 2025-04-22 PROCEDURE — 99214 OFFICE O/P EST MOD 30 MIN: CPT | Performed by: STUDENT IN AN ORGANIZED HEALTH CARE EDUCATION/TRAINING PROGRAM

## 2025-04-22 PROCEDURE — 99212 OFFICE O/P EST SF 10 MIN: CPT | Performed by: STUDENT IN AN ORGANIZED HEALTH CARE EDUCATION/TRAINING PROGRAM

## 2025-04-22 RX ORDER — LIDOCAINE HYDROCHLORIDE 10 MG/ML
0.5 INJECTION, SOLUTION INFILTRATION; PERINEURAL
Status: COMPLETED | OUTPATIENT
Start: 2025-04-22 | End: 2025-04-22

## 2025-04-22 RX ORDER — BETAMETHASONE SODIUM PHOSPHATE AND BETAMETHASONE ACETATE 3; 3 MG/ML; MG/ML
6 INJECTION, SUSPENSION INTRA-ARTICULAR; INTRALESIONAL; INTRAMUSCULAR; SOFT TISSUE
Status: COMPLETED | OUTPATIENT
Start: 2025-04-22 | End: 2025-04-22

## 2025-04-22 RX ADMIN — LIDOCAINE HYDROCHLORIDE 0.5 ML: 10 INJECTION, SOLUTION INFILTRATION; PERINEURAL at 12:26

## 2025-04-22 RX ADMIN — BETAMETHASONE SODIUM PHOSPHATE AND BETAMETHASONE ACETATE 6 MG: 3; 3 INJECTION, SUSPENSION INTRA-ARTICULAR; INTRALESIONAL; INTRAMUSCULAR at 12:26

## 2025-04-22 ASSESSMENT — PAIN - FUNCTIONAL ASSESSMENT: PAIN_FUNCTIONAL_ASSESSMENT: NO/DENIES PAIN

## 2025-04-22 NOTE — PROGRESS NOTES
History of Present Illness   Patient presents today for evaluation of side: left upper extremity pain.    The patient sustained an acute injury on  3/28/2025 .   The patient denies any loss of consciousness or additional significant injuries.  The patient denies any current numbness or tingling.  The pain is sharp, acute in nature, better with rest worse with motion.    4/8/2025 follow-up:  Patient has been in a splint since last visit.  He is continue to try to elevate.  His pain has been well-controlled.  Currently notes it is a 2 out of 10.  Denies numbness tingling.    4/22/2025 follow-up:  Patient has been wearing his Exos brace.  He has been removing it for gentle range of motion and hygiene purposes.  He does note improvement in his pain however he notes stiffness to his small and ring finger.  He also notes clicking and locking to his left long finger.  In August 2024 I did provide him with ring and long trigger finger injections which did provide him with resolution of his symptoms however his locking has recurred to the left long finger.     Past Medical History:   Diagnosis Date    Benign prostatic hyperplasia without lower urinary tract symptoms 12/07/2020    Prostatism       Medication Documentation Review Audit       Reviewed by Peggy Rodgers LPN (Licensed Nurse) on 04/22/25 at 1109      Medication Order Taking? Sig Documenting Provider Last Dose Status   amLODIPine (Norvasc) 5 mg tablet [id number]  TAKE 1 TABLET ONCE DAILY Nj Kwon MD  Active   gentamicin (Garamycin) 0.1 % ointment [id number] No Apply 1 Application topically once daily. Apply to biopsy site on right heel and cover with bandage Historical Provider, MD 3/27/2025 Evening Active                    No Known Allergies    Social History     Socioeconomic History    Marital status:      Spouse name: Not on file    Number of children: Not on file    Years of education: Not on file    Highest education level: Not on file    Occupational History    Not on file   Tobacco Use    Smoking status: Never    Smokeless tobacco: Never   Vaping Use    Vaping status: Not on file   Substance and Sexual Activity    Alcohol use: Not on file    Drug use: Not on file    Sexual activity: Not on file   Other Topics Concern    Not on file   Social History Narrative    Not on file     Social Drivers of Health     Financial Resource Strain: Low Risk  (3/29/2025)    Overall Financial Resource Strain (CARDIA)     Difficulty of Paying Living Expenses: Not very hard   Food Insecurity: No Food Insecurity (3/28/2025)    Hunger Vital Sign     Worried About Running Out of Food in the Last Year: Never true     Ran Out of Food in the Last Year: Never true   Transportation Needs: No Transportation Needs (3/29/2025)    PRAPARE - Transportation     Lack of Transportation (Medical): No     Lack of Transportation (Non-Medical): No   Physical Activity: Inactive (3/28/2025)    Exercise Vital Sign     Days of Exercise per Week: 0 days     Minutes of Exercise per Session: 0 min   Stress: No Stress Concern Present (3/28/2025)    [employer] - Occupational Stress Questionnaire     Feeling of Stress : Not at all   Social Connections: Socially Isolated (3/28/2025)    Social Connection and Isolation Panel [NHANES]     Frequency of Communication with Friends and Family: Never     Frequency of Social Gatherings with Friends and Family: Never     Attends Anabaptist Services: Never     Active Member of Clubs or Organizations: No     Attends Club or Organization Meetings: Never     Marital Status:    Intimate Partner Violence: Not At Risk (3/28/2025)    Humiliation, Afraid, Rape, and Kick questionnaire     Fear of Current or Ex-Partner: No     Emotionally Abused: No     Physically Abused: No     Sexually Abused: No   Housing Stability: Low Risk  (3/29/2025)    Housing Stability Vital Sign     Unable to Pay for Housing in the Last Year: No     Number  of Times Moved in the Last Year: 0     Homeless in the Last Year: No       No past surgical history on file.       Review of Systems   GENERAL: Negative  GI: Negative  MUSCULOSKELETAL: See HPI  SKIN: Negative  NEURO:  Negative     Physical Exam:  side: left upper extremity:  EXOS splint removed  Skin healthy to gross inspection, no breakdown  Mild swelling noted no significant ecchymosis.  Tender palpation over the fifth metacarpal neck.  Tender to palpation over the A1 pulley of the long finger with clicking and locking noted.  No rotational deformity when he makes a composite fist.  Intact flexion and extension of 1st IP joint and finger abduction  Sensation intact to light touch medial / ulnar and radial nerve distribution   Good cap refill     Imaging  XR of the left hand was taken and reviewed in office today.  X-rays dated 4/22/2025 and compared to x-rays dated 4/8/2025 and 3/28/2025  There is 5th metacarpal neck fracture with mild translation and angular displacement.     Assessment   Patient with an acute side: left fifth metacarpal neck fracture and left long trigger finger     Plan:  He should continue his Exos brace for 1 week.  He can continue to take this off for hygiene purposes and gentle range of motion.  In 1 week he should discontinue this completely and get more aggressive with his range of motion.  In regards to his trigger finger he elected to proceed with another trigger finger injection to the left long finger.  This will be his second corticosteroid injection.     Follow-up he will follow-up when he returns from [place] we will repeat x-rays of the left hand.    Hand / UE Inj/Asp: L long A1 for trigger finger on 4/22/2025 12:26 PM  Indications: therapeutic, pain and tendon swelling  Details: 25 G needle, volar approach  Medications: 6 mg betamethasone acet,sod phos 6 mg/mL; 0.5 mL lidocaine 10 mg/mL (1 %)  Outcome: tolerated well, no immediate complications  Procedure, treatment alternatives,  risks and benefits explained, specific risks discussed. Consent was given by the patient. Immediately prior to procedure a time out was called to verify the correct patient, procedure, equipment, support staff and site/side marked as required. Patient was prepped and draped in the usual sterile fashion.

## 2025-04-23 LAB
ANION GAP SERPL CALCULATED.4IONS-SCNC: 7 MMOL/L (CALC) (ref 7–17)
BUN SERPL-MCNC: 18 MG/DL (ref 7–25)
BUN/CREAT SERPL: NORMAL (CALC) (ref 6–22)
CALCIUM SERPL-MCNC: 9.8 MG/DL (ref 8.6–10.3)
CHLORIDE SERPL-SCNC: 104 MMOL/L (ref 98–110)
CO2 SERPL-SCNC: 26 MMOL/L (ref 20–32)
CREAT SERPL-MCNC: 1.16 MG/DL (ref 0.7–1.35)
EGFRCR SERPLBLD CKD-EPI 2021: 69 ML/MIN/1.73M2
GLUCOSE SERPL-MCNC: 99 MG/DL (ref 65–99)
POTASSIUM SERPL-SCNC: 4.7 MMOL/L (ref 3.5–5.3)
SODIUM SERPL-SCNC: 137 MMOL/L (ref 135–146)

## 2025-05-28 ENCOUNTER — APPOINTMENT (OUTPATIENT)
Dept: DERMATOLOGY | Facility: CLINIC | Age: 70
End: 2025-05-28
Payer: COMMERCIAL

## 2025-05-28 VITALS — SYSTOLIC BLOOD PRESSURE: 163 MMHG | DIASTOLIC BLOOD PRESSURE: 103 MMHG | HEART RATE: 56 BPM

## 2025-05-28 DIAGNOSIS — D48.5 NEOPLASM OF UNCERTAIN BEHAVIOR OF SKIN: Primary | ICD-10-CM

## 2025-05-28 PROCEDURE — 99203 OFFICE O/P NEW LOW 30 MIN: CPT | Performed by: DERMATOLOGY

## 2025-05-28 NOTE — PROGRESS NOTES
"Excision Operative Note    Date of Surgery:  5/28/2025  Surgeon:  Gilson Chadwick MD PhD  Office Location: [address]  [facility] [address]  [facility] [address]  Dept: [phone]  Dept Fax: [phone]  Referring Provider: Anjum Kim MD  [address]  [facility] [address]  [place] [address]    Subjective   Alan Johnson \"Pramod\" is a 69 y.o. male who presents for the following: Excision (Right heel, acral compound nevus) for neoplasm of uncertain behavior of skin.    According to the patient, the lesion has been present for approximately 1 month at the time of diagnosis.  The lesion is not causing symptoms.  The lesion has not been treated previously.    The patient does not have a pacemaker / defibrillator.  The patient does not have a heart valve / joint replacement.    The patient is not on blood thinners.   The patient does not have a history of hepatitis B or C.  The patient does not have a history of HIV.  The patient does not have a history of immunosuppression (e.g. organ transplantation, malignancy, medications)    The following portions of the chart were reviewed this encounter and updated as appropriate:         Assessment/Plan     Biopsy Accession Number: [id number]  ACRAL COMPOUND NEVUS  Right Heel      - No pigment on exam today.    + Discussed  benign diagnosis and treatment of observation vs repeat biopsy in detail today.   ++ Pt with + Family history of melanoma, but no immunosuppression, no atypia noted in path or on physical exam today.     + After discussing the risk and benefits of surgery vs observation, the patient selected observation for treatment today.    After the patient left the clinic:  Dr. Chadwick spoke with Dr. Kim to confirm the plan for not excising.  Dr. Chadwick spoke with our  tumor board coordinator and confirmed that there was not a consensus review to indicate excision.    The patient will follow up with their primary dermatologist as " scheduled, next week.     I, Jenny Solis RN am scribing for, and in the presence of Gilson Chadwick MD PhD    I, Gilson Chadwick MD, PhD, personally performed the services described in the documentation as scribed by Jenny Solis RN in my presence, and confirm it is both accurate and complete.

## 2025-06-03 ENCOUNTER — HOSPITAL ENCOUNTER (OUTPATIENT)
Dept: RADIOLOGY | Facility: HOSPITAL | Age: 70
Discharge: HOME | End: 2025-06-03
Payer: COMMERCIAL

## 2025-06-03 ENCOUNTER — OFFICE VISIT (OUTPATIENT)
Dept: ORTHOPEDIC SURGERY | Facility: HOSPITAL | Age: 70
End: 2025-06-03
Payer: COMMERCIAL

## 2025-06-03 DIAGNOSIS — S62.337A CLOSED DISPLACED FRACTURE OF NECK OF FIFTH METACARPAL BONE OF LEFT HAND, INITIAL ENCOUNTER: Primary | ICD-10-CM

## 2025-06-03 DIAGNOSIS — S62.337A CLOSED DISPLACED FRACTURE OF NECK OF FIFTH METACARPAL BONE OF LEFT HAND, INITIAL ENCOUNTER: ICD-10-CM

## 2025-06-03 PROCEDURE — 73130 X-RAY EXAM OF HAND: CPT | Mod: LEFT SIDE | Performed by: RADIOLOGY

## 2025-06-03 PROCEDURE — 1159F MED LIST DOCD IN RCRD: CPT | Performed by: STUDENT IN AN ORGANIZED HEALTH CARE EDUCATION/TRAINING PROGRAM

## 2025-06-03 PROCEDURE — 73130 X-RAY EXAM OF HAND: CPT | Mod: LT

## 2025-06-03 PROCEDURE — 1125F AMNT PAIN NOTED PAIN PRSNT: CPT | Performed by: STUDENT IN AN ORGANIZED HEALTH CARE EDUCATION/TRAINING PROGRAM

## 2025-06-03 PROCEDURE — 99214 OFFICE O/P EST MOD 30 MIN: CPT | Performed by: STUDENT IN AN ORGANIZED HEALTH CARE EDUCATION/TRAINING PROGRAM

## 2025-06-03 PROCEDURE — 99202 OFFICE O/P NEW SF 15 MIN: CPT | Performed by: STUDENT IN AN ORGANIZED HEALTH CARE EDUCATION/TRAINING PROGRAM

## 2025-06-03 ASSESSMENT — PAIN - FUNCTIONAL ASSESSMENT: PAIN_FUNCTIONAL_ASSESSMENT: 0-10

## 2025-06-03 ASSESSMENT — PAIN SCALES - GENERAL: PAINLEVEL_OUTOF10: 3

## 2025-06-03 ASSESSMENT — PAIN DESCRIPTION - DESCRIPTORS: DESCRIPTORS: SORE

## 2025-06-03 NOTE — PROGRESS NOTES
History of Present Illness   Patient presents today for evaluation of side: left upper extremity pain.    The patient sustained an acute injury on 3/28/2025.   The patient denies any loss of consciousness or additional significant injuries.  The patient denies any current numbness or tingling.  The pain is sharp, acute in nature, better with rest worse with motion.    4/8/2025 follow-up:  Patient has been in a splint since last visit.  He is continue to try to elevate.  His pain has been well-controlled.  Currently notes it is a 2 out of 10.  Denies numbness tingling.    4/22/2025 follow-up:  Patient has been wearing his Exos brace.  He has been removing it for gentle range of motion and hygiene purposes.  He does note improvement in his pain however he notes stiffness to his small and ring finger.  He also notes clicking and locking to his left long finger.  In August 2024 I did provide him with ring and long trigger finger injections which did provide him with resolution of his symptoms however his locking has recurred to the left long finger.    6/3/2025 follow-up:  Patient presents for follow-up.  At last visit he had a left long finger trigger finger corticosteroid injection with good relief.  This has not reoccurred.  He has discontinued his brace for his fifth metacarpal fracture.  He has been working on range of motion.  He has been doing activities such as yard work without any significant pain.  He does note some intermittent discomfort.     Past Medical History:   Diagnosis Date    Benign prostatic hyperplasia without lower urinary tract symptoms 12/07/2020    Prostatism       Medication Documentation Review Audit       Reviewed by Peggy Rodgers LPN (Licensed Nurse) on 06/03/25 at 1005      Medication Order Taking? Sig Documenting Provider Last Dose Status   amLODIPine (Norvasc) 5 mg tablet [id number]  TAKE 1 TABLET ONCE DAILY Nj Kwon MD  Active   gentamicin (Garamycin) 0.1 % ointment [id number]  No Apply 1 Application topically once daily. Apply to biopsy site on right heel and cover with bandage Historical Provider, MD 3/27/2025 Evening Active                    No Known Allergies    Social History     Socioeconomic History    Marital status:      Spouse name: Not on file    Number of children: Not on file    Years of education: Not on file    Highest education level: Not on file   Occupational History    Not on file   Tobacco Use    Smoking status: Never    Smokeless tobacco: Never   Vaping Use    Vaping status: Not on file   Substance and Sexual Activity    Alcohol use: Not on file    Drug use: Not on file    Sexual activity: Not on file   Other Topics Concern    Not on file   Social History Narrative    Not on file     Social Drivers of Health     Financial Resource Strain: Low Risk  (3/29/2025)    Overall Financial Resource Strain (CARDIA)     Difficulty of Paying Living Expenses: Not very hard   Food Insecurity: No Food Insecurity (3/28/2025)    Hunger Vital Sign     Worried About Running Out of Food in the Last Year: Never true     Ran Out of Food in the Last Year: Never true   Transportation Needs: No Transportation Needs (3/29/2025)    PRAPARE - Transportation     Lack of Transportation (Medical): No     Lack of Transportation (Non-Medical): No   Physical Activity: Inactive (3/28/2025)    Exercise Vital Sign     Days of Exercise per Week: 0 days     Minutes of Exercise per Session: 0 min   Stress: No Stress Concern Present (3/28/2025)    [employer] - Occupational Stress Questionnaire     Feeling of Stress : Not at all   Social Connections: Socially Isolated (3/28/2025)    Social Connection and Isolation Panel [NHANES]     Frequency of Communication with Friends and Family: Never     Frequency of Social Gatherings with Friends and Family: Never     Attends Yarsani Services: Never     Active Member of Clubs or Organizations: No     Attends Club or Organization  Meetings: Never     Marital Status:    Intimate Partner Violence: Not At Risk (3/28/2025)    Humiliation, Afraid, Rape, and Kick questionnaire     Fear of Current or Ex-Partner: No     Emotionally Abused: No     Physically Abused: No     Sexually Abused: No   Housing Stability: Low Risk  (3/29/2025)    Housing Stability Vital Sign     Unable to Pay for Housing in the Last Year: No     Number of Times Moved in the Last Year: 0     Homeless in the Last Year: No       No past surgical history on file.       Review of Systems   GENERAL: Negative  GI: Negative  MUSCULOSKELETAL: See HPI  SKIN: Negative  NEURO:  Negative     Physical Exam:  side: left upper extremity:  EXOS splint removed  Skin healthy to gross inspection, no breakdown  Mild swelling noted no significant ecchymosis.  Nontender palpation over the fifth metacarpal neck.  Nontender to palpation over the A1 pulley of the long finger with clicking and locking noted.  Mild rotational deformity when he makes a composite fist.  Intact flexion and extension of 1st IP joint and finger abduction  Sensation intact to light touch medial / ulnar and radial nerve distribution   Good cap refill     Imaging  XR of the left hand was taken and reviewed in office today.    There is bridging callus to the fifth metacarpal neck fracture.     Assessment   Patient with an healing side: left fifth metacarpal neck fracture and left long trigger finger     Plan:  He has had resolution of his trigger finger after corticosteroid injection

## 2025-06-05 ENCOUNTER — APPOINTMENT (OUTPATIENT)
Dept: PRIMARY CARE | Facility: CLINIC | Age: 70
End: 2025-06-05
Payer: COMMERCIAL

## 2025-06-05 VITALS — SYSTOLIC BLOOD PRESSURE: 139 MMHG | DIASTOLIC BLOOD PRESSURE: 78 MMHG | BODY MASS INDEX: 32.64 KG/M2 | WEIGHT: 221 LBS

## 2025-06-05 DIAGNOSIS — I10 PRIMARY HYPERTENSION: Primary | ICD-10-CM

## 2025-06-05 PROCEDURE — 99213 OFFICE O/P EST LOW 20 MIN: CPT | Performed by: INTERNAL MEDICINE

## 2025-06-05 PROCEDURE — 3075F SYST BP GE 130 - 139MM HG: CPT | Performed by: INTERNAL MEDICINE

## 2025-06-05 PROCEDURE — 3078F DIAST BP <80 MM HG: CPT | Performed by: INTERNAL MEDICINE
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
## 2025-06-19 DIAGNOSIS — I10 BENIGN ESSENTIAL HYPERTENSION: ICD-10-CM

## 2025-06-19 RX ORDER — AMLODIPINE BESYLATE 5 MG/1
5 TABLET ORAL DAILY
Qty: 90 TABLET | Refills: 0 | Status: SHIPPED | OUTPATIENT
Start: 2025-06-19

## 2025-12-11 ENCOUNTER — APPOINTMENT (OUTPATIENT)
Dept: PRIMARY CARE | Facility: CLINIC | Age: 70
End: 2025-12-11
Payer: COMMERCIAL